# Patient Record
Sex: MALE | Race: WHITE | NOT HISPANIC OR LATINO | URBAN - METROPOLITAN AREA
[De-identification: names, ages, dates, MRNs, and addresses within clinical notes are randomized per-mention and may not be internally consistent; named-entity substitution may affect disease eponyms.]

---
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
## 2019-01-07 LAB — KEPPRA (LEVETIRACETAM) LEVEL: 3.1 UG/ML (ref 12–46)

## 2019-01-09 DIAGNOSIS — G40.109 FOCAL EPILEPSY (H): Primary | ICD-10-CM

## 2019-04-03 NOTE — LETTER
2018       RE: Leobardo Leonard  : 2000   MRN: [id number]      Dear Colleague,    Thank you for referring your patient, Leobardo Leonard, to the [facility] EPILEPSY CARE at [facility]. Please see a copy of my visit note below.    [facility] Epilepsy Care Progress Note      Patient:  Leobardo Leonard  :  2000   Age:  17 year old   Today's Office Visit:  2018    Epilepsy Data:    Ambidextrous man whose seizures started on 2017.  Mom witnessed his seizure, which occurred out of sleep; he suddenly became stiff and started gurgling and shaking all over.  This lasted about 2 minutes.  He was confused postictally.  The patient binge drink on that day.  He had an EEG on 3/24/17, which was abnormal showing focal slowing and frequent sharp transients and spike-and-slow waves over the left frontotemporal head region, maximal at F7.  He was recommended to start lamotrigine by Dr. Calvert, but the patient wanted to get a second opinion. He had a seizure during EEG at [facility].  Mom admitted that she noted similar episode the day before but didn't know it's a seizure.  The patient does not drink regularly, but he does binge drinking every time he drinks.  We started him on oxcarbazepine in 2017.       History of Present Illness:    Miguel had increased seizures recently.  During the previous visit on 2018 we increased his oxcarbazepine to 600-900 mg per day. He still had frequent seizures daily or every other day.  His OXC was increased to 900 mg bid.  They decreased to 2-3 times a week.   The description of his seizures is that he has no warning.  He stares, has some eye twitches.  Last for 15-20 seconds, and then is immediately back to normal.  Sleep-deprivation may have been a trigger.  Because of his work he was sleeping late and was waking up early.  He denies missing medications, and alcohol.  Mom thinks heat might be a trigger too.   His last OXC level after increasing to 900 mg bid was 22.  He denies dizziness, imbalance, double vision and other side effects.      Current Outpatient Prescriptions   Medication Sig Dispense Refill     amphetamine-dextroamphetamine (ADDERALL XR) 10 MG per 24 hr capsule Take 10 mg by mouth daily       ibuprofen (ADVIL/MOTRIN) 200 MG tablet        OXcarbazepine (TRILEPTAL) 300 MG tablet Take 3 tablets (900 mg) by mouth 2 times daily 558 tablet 1     SM CALCIUM 600+D3 600-800 MG-UNIT TABS TAKE 1 TABLET BY MOUTH ONCE DAILY 30 tablet 11      Results for YOLANDA POON (MRN [id number]) as of 7/19/2018 11:26   Ref. Range 5/2/2018 07:14   Oxcarbazepine Latest Ref Range: 3 - 35 mcg/mL 22     Other Issues:    Is patient safe to drive:  No    Exam:    /66 (BP Location: Right arm, Patient Position: Chair, Cuff Size: Adult Regular)  Pulse 80  Temp 98.6  F (37  C)  Wt 74.9 kg (165 lb 3.2 oz)  BMI 23.7 kg/m2     Wt Readings from Last 5 Encounters:   07/19/18 74.9 kg (165 lb 3.2 oz) (69 %)*   01/18/18 76.4 kg (168 lb 6.4 oz) (75 %)*   07/11/17 75.9 kg (167 lb 6.4 oz) (77 %)*   04/18/17 71.7 kg (158 lb) (68 %)*     * Growth percentiles are based on [facility] 2-20 Years data.     General Appearance: Alert, awake, cooperative and pleasant, NAD  Gait and tandem gait: steady  Attention Span:  Normal  Language/speech: No aphasia or dysarthria  Extraocular Movements: Intact  Coordination:  Normal finger to nose  Facial Strength:  Normal  Tongue Strength:  Normal  Motor Exam:  Normal tone, bulk and strength    Assessment and Plan:    Intractable focal epilepsy: had brief focal impaired aware seizures 2-3 times a week.  Seizures are brief.  Increasing oxcarbazepine has helped some.  His last level was 22.  She denies side effects.  I discussed increasing oxcarbazepine to 900-1200 for 1 week and then 1200 mg twice a day.  I changed the patient's tab to 600 mg.  The patient breakthrough this dose of oxcarbazepine we will need to  add another medication.  Has only tried oxcarbazepine.  May add Keppra.     - Increase OXC to 900-1200 mg per day for 1 week, then increase to 1200 mg bid.  - Obtain OXC level in 3 weeks.  - RTC in 4 months      As described above, I met with the patient for 25 minutes and during this time counseling was greater than 50% of the visit time.  Geni Hickman MD                    Again, thank you for allowing me to participate in the care of your patient.      Sincerely,    Geni Hickman MD       DISPLAY PLAN FREE TEXT

## 2019-06-17 ENCOUNTER — OFFICE VISIT (OUTPATIENT)
Dept: NEUROLOGY | Facility: CLINIC | Age: 19
End: 2019-06-17
Payer: COMMERCIAL

## 2019-06-17 VITALS — DIASTOLIC BLOOD PRESSURE: 76 MMHG | SYSTOLIC BLOOD PRESSURE: 118 MMHG | BODY MASS INDEX: 25.05 KG/M2 | WEIGHT: 174.6 LBS

## 2019-06-17 DIAGNOSIS — G40.109 FOCAL EPILEPSY (H): Primary | ICD-10-CM

## 2019-06-17 RX ORDER — LEVETIRACETAM 1000 MG/1
TABLET ORAL
Qty: 225 TABLET | Refills: 1 | Status: SHIPPED | OUTPATIENT
Start: 2019-06-17 | End: 2019-10-14

## 2019-06-17 NOTE — LETTER
2019       RE: Leobardo Leonard  : 2000   MRN: [id number]      Dear Colleague,    Thank you for referring your patient, Leobardo Leonard, to the [facility] EPILEPSY CARE at [facility]. Please see a copy of my visit note below.      [facility] Epilepsy Care Progress Note      Patient:  Leobardo Leonard  :  2000   Age:  18 year old   Today's Office Visit:  2019    Epilepsy Data:    Patient History  Primary Epileptologist/Provider: Geni Hickman M.D.  Age of Onset: 16 years  Other Relevant Dx/ Issues: histry of binge drinking     Tests/Surgery History  Last EE17 frequent epileptiform discharges over the left frontotemporal head region.  The patient also had a complex partial seizure of left frontotemporal onset.    Last MRI: 3/24/17    Seizure Record  Current Visit Date: 19  Previous Visit Date: (P) 18  Months since last visit: (P) 5.52  Seizure Type 1: Tonic seizures  Description of Sz Type 1: out of sleep; he suddenly became stiff and started gurgling and shaking all over  Seizure Type 2: Complex partial seizures unspecified  Description of Sz Type 2: he stares and left eye twitches and he swallows a lot and hands fumble, mainly left hand.     Ambidextrous man whose seizures started on 2017.  Mom witnessed his seizure, which occurred out of sleep; he suddenly became stiff and started gurgling and shaking all over.  This lasted about 2 minutes.  He was confused postictally.  The patient binge drink on that day.  He had an EEG on 3/24/17, which was abnormal showing focal slowing and frequent sharp transients and spike-and-slow waves over the left frontotemporal head region, maximal at F7.  He was recommended to start lamotrigine by Dr. Calvert, but the patient wanted to get a second opinion. He had a seizure during EEG at [facility].  Mom admitted that she noted similar episode the day before but didn't know it's a seizure.   The patient does not drink regularly, but he does binge drinking every time he drinks.  We started him on oxcarbazepine in April 2017.  Keppra was added due to breakthrough seizures despite therapeutic doses of OXC.       History of Present Illness:   He continues to have complex partial seizures despite increase in keppra to 750 mg BID. Since his last visit he had 3 CPS lasting 30-40 seconds and consisting of staring, swallowing and non-responsiveness. No tonic seizures. The most recent seizures was a month ago, which mom witnessed while they were doing dishes. Prior to that he had 2 seizures within a 2 week period of each other ~ 2 months ago. He does not identify any triggers. He does not have auras. He is usually post-ictal for ~2-3 minutes. He sleeps regular hours and he is compliant with medication. He has never missed a dose of medication. He has not been drinking heavily or regularly and seizures have occurred during periods of 2 weeks abstinence from alcohol. Last levels were 1/16/19 with keppra 9.9 resulting in dose increase and oxcarbazepine 30.     Current AEDS  Oxcarbazepine 1200 mg BID   Keppra 750 mg BID     Otherwise he is doing well. No falls, no hospitalization. No other significant health issues. He works at the [employer] many nights a week.     Current Outpatient Medications   Medication Sig Dispense Refill     amphetamine-dextroamphetamine (ADDERALL XR) 10 MG per 24 hr capsule Take 10 mg by mouth daily       ibuprofen (ADVIL/MOTRIN) 200 MG tablet        levETIRAcetam (KEPPRA) 1000 MG tablet Take 1 tab twice a day for 1 week and then increase to 1 tab in the morning and 1 1/2 tabs in the evening. 225 tablet 1     OXcarbazepine (TRILEPTAL) 600 MG tablet Take 2 in the morning and 2 in the evening. 360 tablet 3     SM CALCIUM 600+D3 600-800 MG-UNIT TABS TAKE 1 TABLET BY MOUTH ONCE DAILY 30 tablet 11      Results for YOLANDA POON (MRN [id number]) as of 6/17/2019 13:24   Ref. Range  1/16/2019 12:53   Keppra (Levetiracetam) Level Latest Ref Range: 12.0 - 46.0 ug/mL 9.9 (A)   Oxcarbazepine Latest Ref Range: 3 - 35 ug/mL 30     Medication Notes:        AED Medication Compliance:  compliant all of the time  Using a pill box:  Yes    Review of Systems:  Lethargy / Tiredness:  No  Nausea / Vomiting:  No  Double Vision:  No  Sleepiness:  No  Depression:  No  Slowed Cognitive Function:  No  Memory Problems:  No  Poor Balance:  No  Dizziness:  No  Appetite Changes:  No  Blurred Vision:  No  Decreased Libido:  No  Sleep Changes:  No  Behavioral Changes:  No  Skin: negative  Respiratory: No shortness of breath, dyspnea on exertion, cough, or hemoptysis  Cardiovascular: negative  Have you experienced a traumatic fall since your last visit: NO  Are these falls related to your seizures: Not Applicable    Other Issues:  None  Is patient safe to drive:  No and he was reminded of [place] state driving laws regarding driving or operating heavy machinery.     Exam:    /76 (BP Location: Right arm, Patient Position: Chair, Cuff Size: Adult Regular)   Wt 174 lb 9.6 oz (79.2 kg)   BMI 25.05 kg/m        Wt Readings from Last 5 Encounters:   06/17/19 174 lb 9.6 oz (79.2 kg) (76 %)*   12/31/18 179 lb 6.4 oz (81.4 kg) (82 %)*   07/19/18 165 lb 3.2 oz (74.9 kg) (69 %)*   01/18/18 168 lb 6.4 oz (76.4 kg) (75 %)*   07/11/17 167 lb 6.4 oz (75.9 kg) (77 %)*     * Growth percentiles are based on CDC (Boys, 2-20 Years) data.     General Appearance: Normal  Gait:  Normal  Attention Span:  Normal  Language:  Normal  Extraocular Movements:  Normal  Coordination:  Normal  Visual Fields:  Normal  Facial Sensation:  Normal  Facial Strength:  Normal  Tongue: Midline   Limb Strength:  5/5 throughout   Limb Tone:  Normal  Limb Sensation:  Normal  Reflexes: 2+ throughout.   General Physical Findings:  Normocephalic atraumatic, no increased work of breathing, skin with small scars over back of hands and right forearm.      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 3 complex partial seizures in the past 6 months despite keppra dose increase, good compliance and minimal alcohol. He is on a fairly low dose of keppra and he has good tolerance of his antiseizure medications. We will increase keppra further and have him return in 4 months so that we can continue to make medication adjustments if needed.     - Continue oxcarbazepine 1200 mg BID   - Increase keppra to 1000 - 1500 mg   - RTC in 4 months     Patient was discussed and examined with Dr. García who agrees with my assessment and plan.   Yolanda Roberts MD   Neurology PGY 2     I saw and evaluated the patient and discussed the plan of care with the resident. I agree with the findings, assessment and plan as reported by the resident.  Geni Hickman MD                  Again, thank you for allowing me to participate in the care of your patient.      Sincerely,    Geni Hickman MD

## 2019-06-17 NOTE — PROGRESS NOTES
[facility] Epilepsy Care Progress Note      Patient:  Leobardo Leonard  :  2000   Age:  18 year old   Today's Office Visit:  2019    Epilepsy Data:    Patient History  Primary Epileptologist/Provider: Geni Hickman M.D.  Age of Onset: 16 years  Other Relevant Dx/ Issues: histry of binge drinking     Tests/Surgery History  Last EE17 frequent epileptiform discharges over the left frontotemporal head region.  The patient also had a complex partial seizure of left frontotemporal onset.    Last MRI: 3/24/17    Seizure Record  Current Visit Date: 19  Previous Visit Date: (P) 18  Months since last visit: (P) 5.52  Seizure Type 1: Tonic seizures  Description of Sz Type 1: out of sleep; he suddenly became stiff and started gurgling and shaking all over  Seizure Type 2: Complex partial seizures unspecified  Description of Sz Type 2: he stares and left eye twitches and he swallows a lot and hands fumble, mainly left hand.     Ambidextrous man whose seizures started on 2017.  Mom witnessed his seizure, which occurred out of sleep; he suddenly became stiff and started gurgling and shaking all over.  This lasted about 2 minutes.  He was confused postictally.  The patient binge drink on that day.  He had an EEG on 3/24/17, which was abnormal showing focal slowing and frequent sharp transients and spike-and-slow waves over the left frontotemporal head region, maximal at F7.  He was recommended to start lamotrigine by Dr. Calvert, but the patient wanted to get a second opinion. He had a seizure during EEG at [facility].  Mom admitted that she noted similar episode the day before but didn't know it's a seizure.  The patient does not drink regularly, but he does binge drinking every time he drinks.  We started him on oxcarbazepine in 2017.  Keppra was added due to breakthrough seizures despite therapeutic doses of OXC.       History of Present Illness:   He continues to have complex  partial seizures despite increase in keppra to 750 mg BID. Since his last visit he had 3 CPS lasting 30-40 seconds and consisting of staring, swallowing and non-responsiveness. No tonic seizures. The most recent seizures was a month ago, which mom witnessed while they were doing dishes. Prior to that he had 2 seizures within a 2 week period of each other ~ 2 months ago. He does not identify any triggers. He does not have auras. He is usually post-ictal for ~2-3 minutes. He sleeps regular hours and he is compliant with medication. He has never missed a dose of medication. He has not been drinking heavily or regularly and seizures have occurred during periods of 2 weeks abstinence from alcohol. Last levels were 1/16/19 with keppra 9.9 resulting in dose increase and oxcarbazepine 30.     Current AEDS  Oxcarbazepine 1200 mg BID   Keppra 750 mg BID     Otherwise he is doing well. No falls, no hospitalization. No other significant health issues. He works at the baseball field many nights a week.     Current Outpatient Medications   Medication Sig Dispense Refill     amphetamine-dextroamphetamine (ADDERALL XR) 10 MG per 24 hr capsule Take 10 mg by mouth daily       ibuprofen (ADVIL/MOTRIN) 200 MG tablet        levETIRAcetam (KEPPRA) 1000 MG tablet Take 1 tab twice a day for 1 week and then increase to 1 tab in the morning and 1 1/2 tabs in the evening. 225 tablet 1     OXcarbazepine (TRILEPTAL) 600 MG tablet Take 2 in the morning and 2 in the evening. 360 tablet 3     SM CALCIUM 600+D3 600-800 MG-UNIT TABS TAKE 1 TABLET BY MOUTH ONCE DAILY 30 tablet 11      Results for YOLANDA POON (MRN [id number]) as of 6/17/2019 13:24   Ref. Range 1/16/2019 12:53   Keppra (Levetiracetam) Level Latest Ref Range: 12.0 - 46.0 ug/mL 9.9 (A)   Oxcarbazepine Latest Ref Range: 3 - 35 ug/mL 30     Medication Notes:        AED Medication Compliance:  compliant all of the time  Using a pill box:  Yes    Review of Systems:  Lethargy /  Tiredness:  No  Nausea / Vomiting:  No  Double Vision:  No  Sleepiness:  No  Depression:  No  Slowed Cognitive Function:  No  Memory Problems:  No  Poor Balance:  No  Dizziness:  No  Appetite Changes:  No  Blurred Vision:  No  Decreased Libido:  No  Sleep Changes:  No  Behavioral Changes:  No  Skin: negative  Respiratory: No shortness of breath, dyspnea on exertion, cough, or hemoptysis  Cardiovascular: negative  Have you experienced a traumatic fall since your last visit: NO  Are these falls related to your seizures: Not Applicable    Other Issues:  None  Is patient safe to drive:  No and he was reminded of [place] state driving laws regarding driving or operating heavy machinery.     Exam:    /76 (BP Location: Right arm, Patient Position: Chair, Cuff Size: Adult Regular)   Wt 174 lb 9.6 oz (79.2 kg)   BMI 25.05 kg/m       Wt Readings from Last 5 Encounters:   06/17/19 174 lb 9.6 oz (79.2 kg) (76 %)*   12/31/18 179 lb 6.4 oz (81.4 kg) (82 %)*   07/19/18 165 lb 3.2 oz (74.9 kg) (69 %)*   01/18/18 168 lb 6.4 oz (76.4 kg) (75 %)*   07/11/17 167 lb 6.4 oz (75.9 kg) (77 %)*     * Growth percentiles are based on [facility] (Boys, 2-20 Years) data.     General Appearance: Normal  Gait:  Normal  Attention Span:  Normal  Language:  Normal  Extraocular Movements:  Normal  Coordination:  Normal  Visual Fields:  Normal  Facial Sensation:  Normal  Facial Strength:  Normal  Tongue: Midline   Limb Strength:  5/5 throughout   Limb Tone:  Normal  Limb Sensation:  Normal  Reflexes: 2+ throughout.   General Physical Findings:  Normocephalic atraumatic, no increased work of breathing, skin with small scars over back of hands and right forearm.     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 3 complex partial seizures in the past 6 months despite keppra dose increase, good compliance and minimal alcohol. He is on a fairly low dose of keppra and he has good  tolerance of his antiseizure medications. We will increase keppra further and have him return in 4 months so that we can continue to make medication adjustments if needed.     - Continue oxcarbazepine 1200 mg BID   - Increase keppra to 1000 - 1500 mg   - RTC in 4 months     Patient was discussed and examined with Dr. García who agrees with my assessment and plan.   Yolanda Roberts MD   Neurology PGY 2     I saw and evaluated the patient and discussed the plan of care with the resident. I agree with the findings, assessment and plan as reported by the resident.  Geni Hickman MD

## 2019-10-14 ENCOUNTER — OFFICE VISIT (OUTPATIENT)
Dept: NEUROLOGY | Facility: CLINIC | Age: 19
End: 2019-10-14
Payer: COMMERCIAL

## 2019-10-14 VITALS
TEMPERATURE: 98.5 F | BODY MASS INDEX: 24.97 KG/M2 | SYSTOLIC BLOOD PRESSURE: 125 MMHG | DIASTOLIC BLOOD PRESSURE: 68 MMHG | HEART RATE: 83 BPM | WEIGHT: 174 LBS

## 2019-10-14 DIAGNOSIS — G40.109 FOCAL EPILEPSY (H): Primary | ICD-10-CM

## 2019-10-14 RX ORDER — LEVETIRACETAM 1000 MG/1
TABLET ORAL
Qty: 270 TABLET | Refills: 1 | Status: SHIPPED | OUTPATIENT
Start: 2019-10-14 | End: 2020-03-19

## 2019-10-14 RX ORDER — OXCARBAZEPINE 600 MG/1
TABLET, FILM COATED ORAL
Qty: 360 TABLET | Refills: 1 | Status: SHIPPED | OUTPATIENT
Start: 2019-10-14 | End: 2020-04-14

## 2019-10-14 ASSESSMENT — PAIN SCALES - GENERAL: PAINLEVEL: NO PAIN (0)

## 2019-10-14 NOTE — LETTER
"10/14/2019       RE: Leobardo Leonard  : 2000   MRN: [id number]      Dear Colleague,    Thank you for referring your patient, Leobardo Leonard, to the [facility] EPILEPSY CARE at [facility]. Please see a copy of my visit note below.    [facility] Epilepsy Care Progress Note      Patient:  Leobardo Leonard  :  2000   Age:  19 year old   Today's Office Visit:  10/14/2019    Epilepsy Data:    Patient History  Primary Epileptologist/Provider: (ARBEN) Geni Hickman M.D.  Age of Onset: (P) 16 years  Other Relevant Dx/ Issues: (P) histry of binge drinking     Tests/Surgery History  Last EEG: (P) 17 frequent epileptiform discharges over the left frontotemporal head region.  The patient also had a complex partial seizure of left frontotemporal onset.    Last MRI: (P) 3/24/17    Seizure Record  Current Visit Date: (P) 10/14/19  Previous Visit Date: (P) 19  Months since last visit: (P) 3.91  Seizure Type 1: (P) Tonic seizures  Description of Sz Type 1: (P) out of sleep; he suddenly became stiff and started gurgling and shaking all over  Seizure Type 2: (P) Complex partial seizures unspecified  Description of Sz Type 2: (P) he stares and left eye twitches and he swallows a lot and hands fumble, mainly left hand.     History of Present Illness:    Leobardo is accompanied by his mom for a follow up on his seizures. He had 3 seizures since his last visit in mid-. Last seizure was 1 1/2 m ago. They were his usual seizures of focal impaired-aware Szs. His Keppra was increased in the last visit. Patient and his Mom have noted he is irritable and gets angry easily, a little bit, but not much worse than baseline.     He did cut back on his drinking. He drinks occasionally, and doesn't drink \"much\" each time.       Current Outpatient Medications   Medication Sig Dispense Refill     amphetamine-dextroamphetamine (ADDERALL XR) 10 MG per 24 hr capsule Take 10 mg by " mouth daily       ibuprofen (ADVIL/MOTRIN) 200 MG tablet        levETIRAcetam (KEPPRA) 1000 MG tablet Take 1 tab twice a day for 1 week and then increase to 1 tab in the morning and 1 1/2 tabs in the evening. 225 tablet 1     OXcarbazepine (TRILEPTAL) 600 MG tablet Take 2 in the morning and 2 in the evening. 360 tablet 3     SM CALCIUM 600+D3 600-800 MG-UNIT TABS TAKE 1 TABLET BY MOUTH ONCE DAILY 30 tablet 11        Ref. Range 1/16/2019 12:53   Sodium Latest Ref Range: 136 - 146 mmol/L 138   Keppra (Levetiracetam) Level Latest Ref Range: 12.0 - 46.0 ug/mL 9.9 (A)   Oxcarbazepine Latest Ref Range: 3 - 35 ug/mL 30     Medication Notes:       AED Medication Compliance:  noncompliant some of the time, has been more compliant, mom says he is taking his medications regularly, sometimes he is late for couple hours.    Review of Systems:  Lethargy / Tiredness:  Yes, sometimes  Nausea / Vomiting:  No  Double Vision:  No  Sleepiness:  sometimes  Poor Balance:  No  Dizziness:  No  Blurred Vision:  No  Have you experienced a traumatic fall since your last visit: NO    Other Issues:   Is patient safe to drive:  No    Exam:    /68 (BP Location: Right arm, Patient Position: Chair, Cuff Size: Adult Regular)   Pulse 83   Temp 98.5  F (36.9  C)   Wt 174 lb (78.9 kg)   BMI 24.97 kg/m        Wt Readings from Last 5 Encounters:   10/14/19 174 lb (78.9 kg)   06/17/19 174 lb 9.6 oz (79.2 kg) (76 %)*   12/31/18 179 lb 6.4 oz (81.4 kg) (82 %)*   07/19/18 165 lb 3.2 oz (74.9 kg) (69 %)*   01/18/18 168 lb 6.4 oz (76.4 kg) (75 %)*     * Growth percentiles are based on [facility] (Boys, 2-20 Years) data.     General Appearance: Alert, awake, cooperative and pleasant, NAD  Gait and tandem gait: steady  Attention Span:  Normal  Language/speech: No aphasia or dysarthria  Extraocular Movements: Intact  Coordination:  Normal finger to nose  Facial Strength:  Normal  Tongue Strength:  Normal  Motor Exam:  Normal tone, bulk and  strength     Assessment and Plan:     Focal epilepsy with 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 He had 3 seizures in the past 4 months, last one was 1 1/2-month ago. He feels a little bit more irritable than baseline since increasing levetiracetam.  He has a history of hyponatremia and his last oxcarbazepine level was 30.  I discussed increasing levetiracetam, in case he noted worsening of his mood will need to think of other medications. He hasn't tried any other medications. May replace LVT with birivaracetam. Other options include lamotrigine, zonisamide, lacosamide and topiramate among others.       - Increase levetiracetam to 1500 mg twice daily.  - Continue oxcarbazepine 1200 mg twice daily.  - Obtain AED levels for efficacy and compliance.   - RTC 6 months.      As described above, I met with the patient for 20 minutes and during this time counseling was greater than 50% of the visit time.  Geni Hickman MD                    Again, thank you for allowing me to participate in the care of your patient.      Sincerely,    Geni Hickman MD

## 2019-10-14 NOTE — PROGRESS NOTES
[facility]      Patient:  Leobardo Leonard  :  2000   Age:  19 year old   Today's Office Visit:  10/14/2019    Epilepsy Data:    Patient History  Primary Epileptologist/Provider: Geni Hickman M.D. (P)  Age of Onset: (P) 16 years  Other Relevant Dx/ Issues: (P) histry of binge drinking     Tests/Surgery History  Last EEG: (P) 17 frequent epileptiform discharges over the left frontotemporal head region.  The patient also had a complex partial seizure of left frontotemporal onset.    Last MRI: (P) 3/24/17    Seizure Record  Current Visit Date: (P) 10/14/19  Previous Visit Date: (P) 19  Months since last visit: (P) 3.91  Seizure Type 1: (P) Tonic seizures  Description of Sz Type 1: (P) out of sleep; he suddenly became stiff and started gurgling and shaking all over  Seizure Type 2: (P) Complex partial seizures unspecified  Description of Sz Type 2: (P) he stares and left eye twitches and he swallows a lot and hands fumble, mainly left hand.     History of Present Illness:    Leobardo is accompanied by his mom for a follow up on his seizures. He had 3 seizures since his last visit in mid-. Last seizure was 1 1/2 m ago. They were his usual seizures of focal impaired-aware Szs. His Keppra was increased in the last visit. Patient and his Mom have noted he is irritable and gets angry easily, a little bit, but not much worse than baseline.     He did cut back on his drinking. He drinks occasionally, and doesn't drink \"much\" each time.       Current Outpatient Medications   Medication Sig Dispense Refill     amphetamine-dextroamphetamine (ADDERALL XR) 10 MG per 24 hr capsule Take 10 mg by mouth daily       ibuprofen (ADVIL/MOTRIN) 200 MG tablet        levETIRAcetam (KEPPRA) 1000 MG tablet Take 1 tab twice a day for 1 week and then increase to 1 tab in the morning and 1 1/2 tabs in the evening. 225 tablet 1     OXcarbazepine (TRILEPTAL) 600 MG tablet Take 2 in the " morning and 2 in the evening. 360 tablet 3     SM CALCIUM 600+D3 600-800 MG-UNIT TABS TAKE 1 TABLET BY MOUTH ONCE DAILY 30 tablet 11        Ref. Range 1/16/2019 12:53   Sodium Latest Ref Range: 136 - 146 mmol/L 138   Keppra (Levetiracetam) Level Latest Ref Range: 12.0 - 46.0 ug/mL 9.9 (A)   Oxcarbazepine Latest Ref Range: 3 - 35 ug/mL 30     Medication Notes:       AED Medication Compliance:  noncompliant some of the time, has been more compliant, mom says he is taking his medications regularly, sometimes he is late for couple hours.    Review of Systems:  Lethargy / Tiredness:  Yes, sometimes  Nausea / Vomiting:  No  Double Vision:  No  Sleepiness:  sometimes  Poor Balance:  No  Dizziness:  No  Blurred Vision:  No  Have you experienced a traumatic fall since your last visit: NO    Other Issues:   Is patient safe to drive:  No    Exam:    /68 (BP Location: Right arm, Patient Position: Chair, Cuff Size: Adult Regular)   Pulse 83   Temp 98.5  F (36.9  C)   Wt 174 lb (78.9 kg)   BMI 24.97 kg/m       Wt Readings from Last 5 Encounters:   10/14/19 174 lb (78.9 kg)   06/17/19 174 lb 9.6 oz (79.2 kg) (76 %)*   12/31/18 179 lb 6.4 oz (81.4 kg) (82 %)*   07/19/18 165 lb 3.2 oz (74.9 kg) (69 %)*   01/18/18 168 lb 6.4 oz (76.4 kg) (75 %)*     * Growth percentiles are based on CDC (Boys, 2-20 Years) data.     General Appearance: Alert, awake, cooperative and pleasant, NAD  Gait and tandem gait: steady  Attention Span:  Normal  Language/speech: No aphasia or dysarthria  Extraocular Movements: Intact  Coordination:  Normal finger to nose  Facial Strength:  Normal  Tongue Strength:  Normal  Motor Exam:  Normal tone, bulk and strength     Assessment and Plan:     Focal epilepsy with 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 He had 3 seizures in the past 4 months, last one was 1 1/2-month ago. He feels a little bit more irritable than baseline since increasing levetiracetam.  He has a history of  hyponatremia and his last oxcarbazepine level was 30.  I discussed increasing levetiracetam, in case he noted worsening of his mood will need to think of other medications. He hasn't tried any other medications. May replace LVT with birivaracetam. Other options include lamotrigine, zonisamide, lacosamide and topiramate among others.       - Increase levetiracetam to 1500 mg twice daily.  - Continue oxcarbazepine 1200 mg twice daily.  - Obtain AED levels for efficacy and compliance.   - RTC 6 months.      As described above, I met with the patient for 20 minutes and during this time counseling was greater than 50% of the visit time.  Geni Hickman MD

## 2019-10-21 ENCOUNTER — TRANSFERRED RECORDS (OUTPATIENT)
Dept: HEALTH INFORMATION MANAGEMENT | Facility: CLINIC | Age: 19
End: 2019-10-21

## 2019-10-21 LAB — SODIUM SERPL-SCNC: 138 MMOL/L (ref 136–146)

## 2020-03-12 DIAGNOSIS — G40.109 FOCAL EPILEPSY (H): ICD-10-CM

## 2020-03-19 RX ORDER — LEVETIRACETAM 1000 MG/1
1500 TABLET ORAL
Qty: 270 TABLET | Refills: 0 | Status: SHIPPED | OUTPATIENT
Start: 2020-03-19 | End: 2020-05-05

## 2020-03-19 NOTE — TELEPHONE ENCOUNTER
"Last visit with  / [facility] provider 10/14/19  Future Appointments   Date Time Provider Department Center   4/20/2020  2:00 PM Geni Hickman MD [facility] Owned     Plan at last visit:-  \"- Increase levetiracetam to 1500 mg twice daily.  - Continue oxcarbazepine 1200 mg twice daily.  - Obtain AED levels for efficacy and compliance.   - RTC 6 months.\"    Refill request for levetiracetam received    Will refill to the appointment as per protocol  "

## 2020-03-27 DIAGNOSIS — G40.109 FOCAL EPILEPSY (H): ICD-10-CM

## 2020-03-27 RX ORDER — LEVETIRACETAM 1000 MG/1
TABLET ORAL
Qty: 270 TABLET | Refills: 0 | OUTPATIENT
Start: 2020-03-27

## 2020-04-10 DIAGNOSIS — G40.109 FOCAL EPILEPSY (H): ICD-10-CM

## 2020-04-14 DIAGNOSIS — G40.109 FOCAL EPILEPSY (H): ICD-10-CM

## 2020-04-14 RX ORDER — LEVETIRACETAM 1000 MG/1
1500 TABLET ORAL
Qty: 270 TABLET | Refills: 0 | Status: CANCELLED | OUTPATIENT
Start: 2020-04-14

## 2020-04-14 RX ORDER — OXCARBAZEPINE 600 MG/1
1200 TABLET, FILM COATED ORAL 2 TIMES DAILY
Qty: 120 TABLET | Refills: 0 | Status: SHIPPED | OUTPATIENT
Start: 2020-04-14 | End: 2020-04-20

## 2020-04-20 ENCOUNTER — VIRTUAL VISIT (OUTPATIENT)
Dept: NEUROLOGY | Facility: CLINIC | Age: 20
End: 2020-04-20
Payer: COMMERCIAL

## 2020-04-20 DIAGNOSIS — G40.109 FOCAL EPILEPSY (H): ICD-10-CM

## 2020-04-20 RX ORDER — OXCARBAZEPINE 600 MG/1
TABLET, FILM COATED ORAL
Qty: 405 TABLET | Refills: 3 | Status: SHIPPED | OUTPATIENT
Start: 2020-04-20 | End: 2020-10-26

## 2020-04-20 RX ORDER — FLUTICASONE PROPIONATE 50 MCG
1 SPRAY, SUSPENSION (ML) NASAL DAILY
COMMUNITY

## 2020-04-20 NOTE — LETTER
2020    RE: Leobardo Leonard  [address]  [facility] [address]     Leobardo Leonard is a 19 year old male who is being evaluated via a billable video visit.      Video Start Time: 2:18     Additional provider notes:    [facility] Epilepsy Care Progress Note      Patient:  Leobardo Leonard  :  2000   Age:  19 year old   Today's Virtual Visit:  2020    Epilepsy Data:    Patient History  Primary Epileptologist/Provider: BAILEE) Geni Hickman M.D.  Age of Onset: (P) 16 years  Other Relevant Dx/ Issues: (P) histry of binge drinking     Tests/Surgery History  Last EEG: (P) 17 frequent epileptiform discharges over the left frontotemporal head region.  The patient also had a complex partial seizure of left frontotemporal onset.    Last MRI: (P) 3/24/17    Seizure Record  Current Visit Date: (P) 19  Previous Visit Date: (P) 10/14/19  Months since last visit: (P) 6.21  Seizure Type 1: (P) Tonic seizures  Description of Sz Type 1: (P) out of sleep; he suddenly became stiff and started gurgling and shaking all over  Seizure Type 2: (P) Complex partial seizures unspecified  Description of Sz Type 2: (P) he stares and left eye twitches and he swallows a lot and hands fumble, mainly left hand.     History of Present Illness:   Leobardo is accompanied by his mom during the video visit today. Leobardo had 3-4 seizures in the past 6 months. These were longer than usual and he had a lot of movements during these. He repeatedly squated down and stood up and was walking during couple of them. He is unresponsive and very confused afterwards.  They lasted about a min or longer sometimes.  The last one was about 1 1/2 -2 months ago.  He did not have any auras.     He doesn't know of any trigger for his seizures. He did not miss his medication, was not sick, and was not sleep-deprived.    Leobardo is taking LVT 1500 mg bid and OXC 1200 mg bid. He denies side effects.    Current Outpatient Medications    Medication Sig Dispense Refill     amphetamine-dextroamphetamine (ADDERALL XR) 10 MG per 24 hr capsule Take 10 mg by mouth daily       fluticasone (FLONASE) 50 MCG/ACT nasal spray Spray 1 spray into both nostrils daily       ibuprofen (ADVIL/MOTRIN) 200 MG tablet        levETIRAcetam (KEPPRA) 1000 MG tablet Take 1.5 tablets (1,500 mg) by mouth 2 times daily 270 tablet 0     OXcarbazepine (TRILEPTAL) 600 MG tablet Take 2 tablets (1,200 mg) by mouth 2 times daily 120 tablet 0     SM CALCIUM 600+D3 600-800 MG-UNIT TABS TAKE 1 TABLET BY MOUTH ONCE DAILY 30 tablet 11        Review of Systems:  Lethargy / Tiredness:  No  Nausea / Vomiting:  No  Double Vision:  No  Sleepiness:  No  Depression:  No  Slowed Cognitive Function:  No  Memory Problems:  No  Dizziness:  He gets dizzy in the morning  Appetite Changes:  No  Blurred Vision:  No  Sleep Changes:  No  Respiratory: No shortness of breath and No cough  Cardiovascular: negative  Have you experienced a traumatic fall since your last visit: NO    Other Issues:    Is patient safe to drive:  No      Assessment and Plan:    Intractable focal epilepsy:  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 Leobardo had 3-4 seizures in the past 6 months. He did not miss his medications and did not have any known triggers.     - Continue LVT 1500 mg  Bid  - Increase OXC to 7903-3185  - RTC in 6 months      As described above, I met with the patient face to face via video Zaelab for 16 minutes and during this time counseling was greater than 50% of the visit time.  Geni Hickman MD       Video-Visit Details    Type of service:  Video Visit     Video End Time (time video stopped): 2:34    Originating Location (pt. Location): Home    Distant Location (provider location):  [facility] EPILEPSY CARE     Mode of Communication:  Video Conference via Zaelab    Geni Hickman MD

## 2020-04-20 NOTE — PROGRESS NOTES
"Leobardo Leonard is a 19 year old male who is being evaluated via a billable video visit.      The patient has been notified of following:     \"This video visit will be conducted via a call between you and your physician/provider. We have found that certain health care needs can be provided without the need for an in-person physical exam.  This service lets us provide the care you need with a video conversation.  If a prescription is necessary we can send it directly to your pharmacy.  If lab work is needed we can place an order for that and you can then stop by our lab to have the test done at a later time.    Video visits are billed at different rates depending on your insurance coverage.  Please reach out to your insurance provider with any questions.    If during the course of the call the physician/provider feels a video visit is not appropriate, you will not be charged for this service.\"    Patient has given verbal consent for Video visit? Yes    How would you like to obtain your AVS? Mail a copy    Patient would like the video invitation sent by: Send to e-mail at: [email or web address]       Video Start Time: 2:18     Additional provider notes:    [facility]      Patient:  Leobardo Leonard  :  2000   Age:  19 year old   Today's Virtual Visit:  2020    Epilepsy Data:    Patient History  Primary Epileptologist/Provider: Geni Hickman M.D. (P)  Age of Onset: (P) 16 years  Other Relevant Dx/ Issues: (P) histry of binge drinking     Tests/Surgery History  Last EEG: (P) 17 frequent epileptiform discharges over the left frontotemporal head region.  The patient also had a complex partial seizure of left frontotemporal onset.    Last MRI: (P) 3/24/17    Seizure Record  Current Visit Date: (P) 19  Previous Visit Date: (P) 10/14/19  Months since last visit: (P) 6.21  Seizure Type 1: (P) Tonic seizures  Description of Sz Type 1: (P) out of sleep; he " suddenly became stiff and started gurgling and shaking all over  Seizure Type 2: (P) Complex partial seizures unspecified  Description of Sz Type 2: (P) he stares and left eye twitches and he swallows a lot and hands fumble, mainly left hand.     History of Present Illness:   Leobardo is accompanied by his mom during the video visit today. Leobardo had 3-4 seizures in the past 6 months. These were longer than usual and he had a lot of movements during these. He repeatedly squated down and stood up and was walking during couple of them. He is unresponsive and very confused afterwards.  They lasted about a min or longer sometimes.  The last one was about 1 1/2 -2 months ago.  He did not have any auras.     He doesn't know of any trigger for his seizures. He did not miss his medication, was not sick, and was not sleep-deprived.    Leobardo is taking LVT 1500 mg bid and OXC 1200 mg bid. He denies side effects.    Current Outpatient Medications   Medication Sig Dispense Refill     amphetamine-dextroamphetamine (ADDERALL XR) 10 MG per 24 hr capsule Take 10 mg by mouth daily       fluticasone (FLONASE) 50 MCG/ACT nasal spray Spray 1 spray into both nostrils daily       ibuprofen (ADVIL/MOTRIN) 200 MG tablet        levETIRAcetam (KEPPRA) 1000 MG tablet Take 1.5 tablets (1,500 mg) by mouth 2 times daily 270 tablet 0     OXcarbazepine (TRILEPTAL) 600 MG tablet Take 2 tablets (1,200 mg) by mouth 2 times daily 120 tablet 0     SM CALCIUM 600+D3 600-800 MG-UNIT TABS TAKE 1 TABLET BY MOUTH ONCE DAILY 30 tablet 11        Review of Systems:  Lethargy / Tiredness:  No  Nausea / Vomiting:  No  Double Vision:  No  Sleepiness:  No  Depression:  No  Slowed Cognitive Function:  No  Memory Problems:  No  Dizziness:  He gets dizzy in the morning  Appetite Changes:  No  Blurred Vision:  No  Sleep Changes:  No  Respiratory: No shortness of breath and No cough  Cardiovascular: negative  Have you experienced a traumatic fall since your last visit:  NO    Other Issues:    Is patient safe to drive:  No      Assessment and Plan:    Intractable focal epilepsy:  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 Leobardo had 3-4 seizures in the past 6 months. He did not miss his medications and did not have any known triggers.     - Continue LVT 1500 mg  Bid  - Increase OXC to 9044-0757  - RTC in 6 months      As described above, I met with the patient face to face via video [employer] for 16 minutes and during this time counseling was greater than 50% of the visit time.  Geni Hickman MD       Video-Visit Details    Type of service:  Video Visit     Video End Time (time video stopped): 2:34    Originating Location (pt. Location): Home    Distant Location (provider location):  [facility] EPILEPSY CARE     Mode of Communication:  Video Conference via [employer]

## 2020-05-04 DIAGNOSIS — G40.109 FOCAL EPILEPSY (H): ICD-10-CM

## 2020-05-05 RX ORDER — LEVETIRACETAM 1000 MG/1
1500 TABLET ORAL
Qty: 270 TABLET | Refills: 0 | Status: SHIPPED | OUTPATIENT
Start: 2020-05-05 | End: 2020-08-25

## 2020-05-05 NOTE — TELEPHONE ENCOUNTER
levETIRAcetam (KEPPRA) 1000 MG tablet  Take 1.5 tablets (1,500 mg) by mouth 2 times daily   Last Written Prescription Date:  3/19/20  Last Fill Quantity: 270,   # refills: 0  Last Office Visit : 4/20/20 Virtual visit  Plan:  - Continue LVT 1500 mg  Bid  - Increase OXC to 5248-6654  - RTC in 6 months     Future Office visit:  none    LABS: creatinine if age > 65 yrs old    Refill to pharmacy.     Scheduling has been notified to contact the pt for 6 month follow up appointment.

## 2020-08-20 DIAGNOSIS — G40.109 FOCAL EPILEPSY (H): ICD-10-CM

## 2020-08-25 NOTE — TELEPHONE ENCOUNTER
LEVETIRACETAM 1000MG TABLET   Last Written Prescription Date:  5/5/2020  Last Fill Quantity: 270,   # refills: 0  Last Office Visit : 4/20/2020  Future Office visit:  None    Routing refill request to provider for review/approval because:  Is it ok to add refills to current order?   Also, would the Provider like the Pt to have a follow up appointment in Sept or Oct for 6 months office visit?   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 Leobardo had 3-4 seizures in the past 6 months. He did not miss his medications and did not have any known triggers.      - Continue LVT 1500 mg  Bid  - Increase OXC to 7471-2708  - RTC in 6 months        As described above, I met with the patient face to face via video [employer] for 16 minutes and during this time counseling was greater than 50% of the visit time.  Geni Hickman MD

## 2020-08-28 RX ORDER — LEVETIRACETAM 1000 MG/1
1500 TABLET ORAL 2 TIMES DAILY
Qty: 270 TABLET | Refills: 0 | Status: SHIPPED | OUTPATIENT
Start: 2020-08-28 | End: 2020-10-26

## 2020-08-28 NOTE — TELEPHONE ENCOUNTER
prescription refilled per protocol.  message sent to scheduling to arrange a visit with the MD for October 2020 (6 month follow up)

## 2020-10-26 ENCOUNTER — VIRTUAL VISIT (OUTPATIENT)
Dept: NEUROLOGY | Facility: CLINIC | Age: 20
End: 2020-10-26
Payer: COMMERCIAL

## 2020-10-26 DIAGNOSIS — G40.109 FOCAL EPILEPSY (H): Primary | ICD-10-CM

## 2020-10-26 RX ORDER — OXCARBAZEPINE 600 MG/1
TABLET, FILM COATED ORAL
Qty: 405 TABLET | Refills: 3 | Status: SHIPPED | OUTPATIENT
Start: 2020-10-26 | End: 2021-10-18

## 2020-10-26 RX ORDER — LEVETIRACETAM 1000 MG/1
1500 TABLET ORAL 2 TIMES DAILY
Qty: 270 TABLET | Refills: 3 | Status: SHIPPED | OUTPATIENT
Start: 2020-10-26 | End: 2021-10-18

## 2020-10-26 NOTE — PROGRESS NOTES
"Leobardo Leonard is a 20 year old male who is being evaluated via a billable video visit.      The patient has been notified of following:     \"This video visit will be conducted via a call between you and your physician/provider. We have found that certain health care needs can be provided without the need for an in-person physical exam.  This service lets us provide the care you need with a video conversation.  If a prescription is necessary we can send it directly to your pharmacy.  If lab work is needed we can place an order for that and you can then stop by our lab to have the test done at a later time.    Video visits are billed at different rates depending on your insurance coverage.  Please reach out to your insurance provider with any questions.    If during the course of the call the physician/provider feels a video visit is not appropriate, you will not be charged for this service.\"    Patient has given verbal consent for Video visit? Yes  How would you like to obtain your AVS? Mail a copy  If you are dropped from the video visit, the video invite should be resent to: Send to e-mail at: [email or web address]    Will anyone else be joining your video visit? Yes: Mother . How would they like to receive their invitation? Other e-mail: N/A        Video-Visit Details    Type of service:  Video Visit    Video Start Time: 908  Video End Time: 925    Originating Location (pt. Location): Home    Distant Location (provider location):  [facility] EPILEPSY CARE     Platform used for Video Visit: Other: [facility] Epilepsy Care Progress Note      Patient:  Leobardo Leonard  :  2000   Age:  20 year old   Today's Video Visit:  10/26/2020    Epilepsy Data:    Patient History  Primary Epileptologist/Provider: (P) Geni Hickman M.D.  Age of Onset: (P) 16 years  Other Relevant Dx/ Issues: (P) histry of binge drinking      Tests/Surgery History  Last EEG: (P) 17 frequent epileptiform " discharges over the left frontotemporal head region.  The patient also had a complex partial seizure of left frontotemporal onset.    Last MRI: (P) 3/24/17     Seizure Record  Current Visit Date: (P) 10/26/2020  Previous Visit Date: (P) 4/20/2020  Months since last visit: (P) 6  Seizure Type 1: (P) Tonic seizures  Description of Sz Type 1: (P) out of sleep; he suddenly became stiff and started gurgling and shaking all over  Seizure Type 2: (P) Complex partial seizures unspecified  Description of Sz Type 2: (P) he stares and left eye twitches and he swallows a lot and hands fumble, mainly left hand.       History of Present Illness:   Leobardo is accompanied by his mom during the video visit today. Leobardo had no seizures since January 2020. Prior to this he was having 3-4 seizures every 6 months.      He doesn't know of any trigger for his seizures. He is compliant with taking his medications regularly. He reports mild balance difficulty and double vision if he takes him medication without eating food with his medication     Leobardo is taking LVT 1500 mg bid and OXC 6981-3691 mg. .       Current Outpatient Medications   Medication Sig Dispense Refill     amphetamine-dextroamphetamine (ADDERALL XR) 10 MG per 24 hr capsule Take 10 mg by mouth daily       fluticasone (FLONASE) 50 MCG/ACT nasal spray Spray 1 spray into both nostrils daily       ibuprofen (ADVIL/MOTRIN) 200 MG tablet        levETIRAcetam (KEPPRA) 1000 MG tablet Take 1.5 tablets (1,500 mg) by mouth 2 times daily 270 tablet 0     OXcarbazepine (TRILEPTAL) 600 MG tablet Take 2 tabs am and 2 1/2 tabs pm 405 tablet 3     SM CALCIUM 600+D3 600-800 MG-UNIT TABS TAKE 1 TABLET BY MOUTH ONCE DAILY 30 tablet 11        Medication Notes:        AED Medication Compliance:  compliant most of the time  Using a pill box:  No    Review of Systems:  Lethargy / Tiredness:  No  Nausea / Vomiting:  No  Double Vision:  Yes  Sleepiness:  No  Depression:  No  Slowed Cognitive  Function:  No  Memory Problems:  No  Poor Balance:  Yes  Dizziness:  Yes  Appetite Changes:  No  Blurred Vision:  No  Decreased Libido:  No  Sleep Changes:  No  Behavioral Changes:  No  Skin: negative  Respiratory: No shortness of breath, dyspnea on exertion, cough, or hemoptysis  Cardiovascular: negative  Have you experienced a traumatic fall since your last visit: NO  Are these falls related to your seizures: Not Applicable    Other Issues:    Is patient safe to drive:  No    Exam:    General Appearance: Normal  Gait:  Not Examined  Attention Span:  Normal  Language/speech:  Normal, no aphasia or dysarthria  Extraocular Movements:  Normal  Coordination:  Normal  Facial Strength:  Normal    Assessment and Plan:   Intractable focal epilepsy:  focal impaired-aware seizures of probable left temporal onset. Leobardo had no seizures since January. Prior to that he was having 3-4 seizures/6 months. He did not miss his medications and did not have any known triggers. He is tolerating the increase in oxcarbazepine well.       - Continue LVT 1500 mg  Bid  - Continue OXC to 9168-7176  - Will check AED level   - RTC in 6 months        As described above, I met with the patient for 16 minutes and during this time counseling was within 50% of the visit time.  I evaluated the patient with Dr. García who agrees with my assessment and plan above.   Ashley Silva DO  Neurology PGY-3   3640    I saw and evaluated the patient and discussed the plan of care with Dr. Ashley Silva . I have reviewed her note and agree with the findings, impression and plan.   Geni Hickman MD

## 2020-10-26 NOTE — LETTER
"10/26/2020       RE: Leobardo Leonard  : 2000   MRN: [id number]      Dear Colleague,    Thank you for referring your patient, Leobardo Leonard, to the [facility] EPILEPSY CARE at [facility]. Please see a copy of my visit note below.    Leobardo Leonard is a 20 year old male who is being evaluated via a billable video visit.      The patient has been notified of following:     \"This video visit will be conducted via a call between you and your physician/provider. We have found that certain health care needs can be provided without the need for an in-person physical exam.  This service lets us provide the care you need with a video conversation.  If a prescription is necessary we can send it directly to your pharmacy.  If lab work is needed we can place an order for that and you can then stop by our lab to have the test done at a later time.    Video visits are billed at different rates depending on your insurance coverage.  Please reach out to your insurance provider with any questions.    If during the course of the call the physician/provider feels a video visit is not appropriate, you will not be charged for this service.\"    Patient has given verbal consent for Video visit? Yes  How would you like to obtain your AVS? Mail a copy  If you are dropped from the video visit, the video invite should be resent to: Send to e-mail at: [email or web address]    Will anyone else be joining your video visit? Yes: Mother . How would they like to receive their invitation? Other e-mail: N/A        Video-Visit Details    Type of service:  Video Visit    Video Start Time: 908  Video End Time: 925    Originating Location (pt. Location): Home    Distant Location (provider location):  [facility] EPILEPSY CARE     Platform used for Video Visit: Other: N[employer]      [facility] Epilepsy Care Progress Note      Patient:  Leobardo Leonard  :  2000   Age:  20 year old   Today's " Video Visit:  10/26/2020    Epilepsy Data:    Patient History  Primary Epileptologist/Provider: (ARBEN) Geni Hickman M.D.  Age of Onset: (P) 16 years  Other Relevant Dx/ Issues: (P) histry of binge drinking      Tests/Surgery History  Last EEG: (P) 4/18/17 frequent epileptiform discharges over the left frontotemporal head region.  The patient also had a complex partial seizure of left frontotemporal onset.    Last MRI: (P) 3/24/17     Seizure Record  Current Visit Date: (P) 10/26/2020  Previous Visit Date: (P) 4/20/2020  Months since last visit: (P) 6  Seizure Type 1: (P) Tonic seizures  Description of Sz Type 1: (P) out of sleep; he suddenly became stiff and started gurgling and shaking all over  Seizure Type 2: (P) Complex partial seizures unspecified  Description of Sz Type 2: (P) he stares and left eye twitches and he swallows a lot and hands fumble, mainly left hand.       History of Present Illness:   Leobardo is accompanied by his mom during the video visit today. Leobardo had no seizures since January 2020. Prior to this he was having 3-4 seizures every 6 months.      He doesn't know of any trigger for his seizures. He is compliant with taking his medications regularly. He reports mild balance difficulty and double vision if he takes him medication without eating food with his medication     Leobardo is taking LVT 1500 mg bid and OXC 0914-1708 mg. .       Current Outpatient Medications   Medication Sig Dispense Refill     amphetamine-dextroamphetamine (ADDERALL XR) 10 MG per 24 hr capsule Take 10 mg by mouth daily       fluticasone (FLONASE) 50 MCG/ACT nasal spray Spray 1 spray into both nostrils daily       ibuprofen (ADVIL/MOTRIN) 200 MG tablet        levETIRAcetam (KEPPRA) 1000 MG tablet Take 1.5 tablets (1,500 mg) by mouth 2 times daily 270 tablet 0     OXcarbazepine (TRILEPTAL) 600 MG tablet Take 2 tabs am and 2 1/2 tabs pm 405 tablet 3     SM CALCIUM 600+D3 600-800 MG-UNIT TABS TAKE 1 TABLET BY MOUTH  ONCE DAILY 30 tablet 11        Medication Notes:        AED Medication Compliance:  compliant most of the time  Using a pill box:  No    Review of Systems:  Lethargy / Tiredness:  No  Nausea / Vomiting:  No  Double Vision:  Yes  Sleepiness:  No  Depression:  No  Slowed Cognitive Function:  No  Memory Problems:  No  Poor Balance:  Yes  Dizziness:  Yes  Appetite Changes:  No  Blurred Vision:  No  Decreased Libido:  No  Sleep Changes:  No  Behavioral Changes:  No  Skin: negative  Respiratory: No shortness of breath, dyspnea on exertion, cough, or hemoptysis  Cardiovascular: negative  Have you experienced a traumatic fall since your last visit: NO  Are these falls related to your seizures: Not Applicable    Other Issues:    Is patient safe to drive:  No    Exam:    General Appearance: Normal  Gait:  Not Examined  Attention Span:  Normal  Language/speech:  Normal, no aphasia or dysarthria  Extraocular Movements:  Normal  Coordination:  Normal  Facial Strength:  Normal    Assessment and Plan:   Intractable focal epilepsy:  focal impaired-aware seizures of probable left temporal onset. Leobardo had no seizures since January. Prior to that he was having 3-4 seizures/6 months. He did not miss his medications and did not have any known triggers. He is tolerating the increase in oxcarbazepine well.       - Continue LVT 1500 mg  Bid  - Continue OXC to 5437-2099  - Will check AED level   - RTC in 6 months        As described above, I met with the patient for 16 minutes and during this time counseling was within 50% of the visit time.  I evaluated the patient with Dr. García who agrees with my assessment and plan above.   Ashley Silva DO  Neurology PGY-3   6332    I saw and evaluated the patient and discussed the plan of care with Dr. Ashley Silva . I have reviewed her note and agree with the findings, impression and plan.   Geni Hickman MD                      Again, thank you for allowing me to participate in the care of  your patient.      Sincerely,    Geni Hickman MD

## 2020-11-09 ENCOUNTER — TRANSFERRED RECORDS (OUTPATIENT)
Dept: HEALTH INFORMATION MANAGEMENT | Facility: CLINIC | Age: 20
End: 2020-11-09

## 2020-11-09 LAB
KEPPRA (LEVETIRACETAM) LEVEL: 17.7 MCG/ML (ref 12–46)
OXCARBAZEPINE METABOLITE (MHC) S: 25 MCG/ML (ref 3–35)

## 2020-11-10 DIAGNOSIS — G40.109 FOCAL EPILEPSY (H): ICD-10-CM

## 2021-10-14 DIAGNOSIS — G40.109 FOCAL EPILEPSY (H): ICD-10-CM

## 2021-10-18 RX ORDER — OXCARBAZEPINE 600 MG/1
TABLET, FILM COATED ORAL
Qty: 135 TABLET | Refills: 0 | Status: SHIPPED | OUTPATIENT
Start: 2021-10-18 | End: 2021-11-01

## 2021-10-18 RX ORDER — LEVETIRACETAM 1000 MG/1
1500 TABLET ORAL 2 TIMES DAILY
Qty: 90 TABLET | Refills: 0 | Status: SHIPPED | OUTPATIENT
Start: 2021-10-18 | End: 2021-11-01

## 2021-10-18 NOTE — TELEPHONE ENCOUNTER
LEVETIRACETAM 1000MG TABLET      Last Written Prescription Date:  10/26/20  Last Fill Quantity: 270,   # refills: 3  Last Office Visit : 10/26/20 recommended 6 month follow up  Future Office visit:  None scheduled  Under age 65, no creatinine needed  Filling per [facility] medication refill protocols - seizure medications.  Not all labs required.  30 day refill provided per protocol, routed to clinic scheduling for follow up      OXCARBAZEPINE 600MG TABLET      Last Written Prescription Date:  10/26/20  Last Fill Quantity: 405,   # refills: 3  Last Office Visit : 10/26/20 recommended 6 month follow up  Future Office visit:  None scheduled    Routing refill request to provider for review/approval because:  Most recent sodium 10/21/19 in care everywhere  Nothing more recent in EPIC nor media

## 2021-11-01 ENCOUNTER — VIRTUAL VISIT (OUTPATIENT)
Dept: NEUROLOGY | Facility: CLINIC | Age: 21
End: 2021-11-01
Payer: COMMERCIAL

## 2021-11-01 DIAGNOSIS — G40.109 FOCAL EPILEPSY (H): Primary | ICD-10-CM

## 2021-11-01 RX ORDER — OXCARBAZEPINE 600 MG/1
TABLET, FILM COATED ORAL
Qty: 405 TABLET | Refills: 3 | Status: SHIPPED | OUTPATIENT
Start: 2021-11-01 | End: 2022-10-31

## 2021-11-01 RX ORDER — LEVETIRACETAM 1000 MG/1
1500 TABLET ORAL 2 TIMES DAILY
Qty: 270 TABLET | Refills: 3 | Status: SHIPPED | OUTPATIENT
Start: 2021-11-01 | End: 2022-10-31

## 2021-11-01 NOTE — LETTER
2021       RE: Leobardo Leonard  : 2000   MRN: [id number]      Dear Colleague,    Thank you for referring your patient, Leobardo Leonard, to the [facility] EPILEPSY CARE at [facility]. Please see a copy of my visit note below.    [facility] Epilepsy Care Progress Note      Patient:  Leobardo Leonard  :  2000   Age:  21 year old   Today's Virtual Visit:  2021    Epilepsy Data:    Patient History  Primary Epileptologist/Provider: Geni Hickman M.D.  Age of Onset: 16 years  Other Relevant Dx/ Issues: histry of binge drinking     Tests/Surgery History  Last EE17 frequent epileptiform discharges over the left frontotemporal head region.  The patient also had a complex partial seizure of left frontotemporal onset.    Last MRI: 3/24/17    Seizure Record  Current Visit Date: 21  Seizure Type 1: Tonic seizures  Description of Sz Type 1: out of sleep; he suddenly became stiff and started gurgling and shaking all over  Seizure Type 2: Complex partial seizures unspecified  Description of Sz Type 2: he stares and left eye twitches and he swallows a lot and hands fumble, mainly left hand.     History of Present Illness:    Leobardo is participating in this virtual visit for follow-up on his epilepsy.  He is is accompanied by his mom.  He was last seen 2020.  He has not had any seizures since last visit.  His last seizure was 2020.  His oxcarbazepine was increased by 300 mg in the evening in 2020.  He is taking oxcarbazepine 5016-4830 and levetiracetam 1500 mg twice daily.  He denies irritability, depression, anxiety, drowsiness or tiredness.  He experiences occasional dizziness, however he does not think it's significant.    Current Outpatient Medications   Medication Sig Dispense Refill     amphetamine-dextroamphetamine (ADDERALL XR) 10 MG per 24 hr capsule Take 10 mg by mouth daily       fluticasone  (FLONASE) 50 MCG/ACT nasal spray Spray 1 spray into both nostrils daily       ibuprofen (ADVIL/MOTRIN) 200 MG tablet        levETIRAcetam (KEPPRA) 1000 MG tablet Take 1.5 tablets (1,500 mg) by mouth 2 times daily For additional refills, please schedule a follow-up appointment at [phone] 90 tablet 0     OXcarbazepine (TRILEPTAL) 600 MG tablet TAKE 2 TABLETS BY MOUTH EVERY MORNING AND 2 AND 1/2 TABLETS EVERY EVENING. For additional refills, please schedule a follow-up appointment at [phone] 135 tablet 0     SM CALCIUM 600+D3 600-800 MG-UNIT TABS TAKE 1 TABLET BY MOUTH ONCE DAILY 30 tablet 11        Other Issues:    Is patient safe to drive:  yes    Exam:    There were no vitals taken for this visit.     Wt Readings from Last 5 Encounters:   10/14/19 174 lb (78.9 kg)   06/17/19 174 lb 9.6 oz (79.2 kg) (76 %, Z= 0.70)*   12/31/18 179 lb 6.4 oz (81.4 kg) (82 %, Z= 0.90)*   07/19/18 165 lb 3.2 oz (74.9 kg) (69 %, Z= 0.50)*   01/18/18 168 lb 6.4 oz (76.4 kg) (75 %, Z= 0.68)*     * Growth percentiles are based on [facility] (Boys, 2-20 Years) data.       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 Leobardo had no seizures since last visit. Prior to that he was having 3-4 seizures/6 months.  He is taking levetiracetam 1500 mg twice daily and oxcarbazepine 7267-6275.  He has occasional dizziness, but it is not bothersome.     - Continue LVT 1500 mg Bid  - Continue OXC to 8486-8492  - Obtain AED levels   - RTC in 1 yr.      As described above, I met with the patient and his family for 7 minutes and during this time counseling was within 50% of the visit time.This note was created in part by the use of Dragon voice recognition system. Inadvertent grammatical errors and typographical errors may still exist.  Geni Hickman MD    Leobardo is a 21 year old who is being evaluated via a billable video visit.      How would you like to obtain your AVS? Mail a  copy  If the video visit is dropped, the invitation should be resent by: Text to cell phone: Call [phone]  Will anyone else be joining your video visit?     Video Start Time: 8:16  Video-Visit Details    Type of service:  Video Visit    Video End Time:8:23    Originating Location (pt. Location): Home    Distant Location (provider location):  [facility] EPILEPSY CARE     Platform used for Video Visit: [employer]      Again, thank you for allowing me to participate in the care of your patient.      Sincerely,    Geni Hickman MD

## 2021-11-01 NOTE — PROGRESS NOTES
[facility] Epilepsy Care Progress Note      Patient:  Leobardo Leonard  :  2000   Age:  21 year old   Today's Virtual Visit:  2021    Epilepsy Data:    Patient History  Primary Epileptologist/Provider: Geni Hickman M.D.  Age of Onset: 16 years  Other Relevant Dx/ Issues: histry of binge drinking     Tests/Surgery History  Last EE17 frequent epileptiform discharges over the left frontotemporal head region.  The patient also had a complex partial seizure of left frontotemporal onset.    Last MRI: 3/24/17    Seizure Record  Current Visit Date: 21  Seizure Type 1: Tonic seizures  Description of Sz Type 1: out of sleep; he suddenly became stiff and started gurgling and shaking all over  Seizure Type 2: Complex partial seizures unspecified  Description of Sz Type 2: he stares and left eye twitches and he swallows a lot and hands fumble, mainly left hand.     History of Present Illness:    Leobardo is participating in this virtual visit for follow-up on his epilepsy.  He is is accompanied by his mom.  He was last seen 2020.  He has not had any seizures since last visit.  His last seizure was 2020.  His oxcarbazepine was increased by 300 mg in the evening in 2020.  He is taking oxcarbazepine 6401-6541 and levetiracetam 1500 mg twice daily.  He denies irritability, depression, anxiety, drowsiness or tiredness.  He experiences occasional dizziness, however he does not think it's significant.    Current Outpatient Medications   Medication Sig Dispense Refill     amphetamine-dextroamphetamine (ADDERALL XR) 10 MG per 24 hr capsule Take 10 mg by mouth daily       fluticasone (FLONASE) 50 MCG/ACT nasal spray Spray 1 spray into both nostrils daily       ibuprofen (ADVIL/MOTRIN) 200 MG tablet        levETIRAcetam (KEPPRA) 1000 MG tablet Take 1.5 tablets (1,500 mg) by mouth 2 times daily For additional refills, please schedule a follow-up appointment at [phone]  tablet 0     OXcarbazepine (TRILEPTAL) 600 MG tablet TAKE 2 TABLETS BY MOUTH EVERY MORNING AND 2 AND 1/2 TABLETS EVERY EVENING. For additional refills, please schedule a follow-up appointment at [phone] 135 tablet 0     SM CALCIUM 600+D3 600-800 MG-UNIT TABS TAKE 1 TABLET BY MOUTH ONCE DAILY 30 tablet 11        Other Issues:    Is patient safe to drive:  yes    Exam:    There were no vitals taken for this visit.     Wt Readings from Last 5 Encounters:   10/14/19 174 lb (78.9 kg)   06/17/19 174 lb 9.6 oz (79.2 kg) (76 %, Z= 0.70)*   12/31/18 179 lb 6.4 oz (81.4 kg) (82 %, Z= 0.90)*   07/19/18 165 lb 3.2 oz (74.9 kg) (69 %, Z= 0.50)*   01/18/18 168 lb 6.4 oz (76.4 kg) (75 %, Z= 0.68)*     * Growth percentiles are based on CDC (Boys, 2-20 Years) data.       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 Leobardo had no seizures since last visit. Prior to that he was having 3-4 seizures/6 months.  He is taking levetiracetam 1500 mg twice daily and oxcarbazepine 5994-3160.  He has occasional dizziness, but it is not bothersome.     - Continue LVT 1500 mg Bid  - Continue OXC to 4384-4572  - Obtain AED levels   - RTC in 1 yr.      As described above, I met with the patient and his family for 7 minutes and during this time counseling was within 50% of the visit time.This note was created in part by the use of Dragon voice recognition system. Inadvertent grammatical errors and typographical errors may still exist.  Geni Hickman MD    Leobardo is a 21 year old who is being evaluated via a billable video visit.      How would you like to obtain your AVS? Mail a copy  If the video visit is dropped, the invitation should be resent by: Text to cell phone: Call [phone]  Will anyone else be joining your video visit?     Video Start Time: 8:16  Video-Visit Details    Type of service:  Video Visit    Video End Time:8:23    Originating Location (pt.  Location): Home    Distant Location (provider location):  [facility] EPILEPSY CARE     Platform used for Video Visit: Mary

## 2022-10-31 ENCOUNTER — VIRTUAL VISIT (OUTPATIENT)
Dept: NEUROLOGY | Facility: CLINIC | Age: 22
End: 2022-10-31
Payer: COMMERCIAL

## 2022-10-31 VITALS — HEIGHT: 71 IN | WEIGHT: 167 LBS | BODY MASS INDEX: 23.38 KG/M2

## 2022-10-31 DIAGNOSIS — G40.109 FOCAL EPILEPSY (H): ICD-10-CM

## 2022-10-31 RX ORDER — LEVETIRACETAM 1000 MG/1
1500 TABLET ORAL 2 TIMES DAILY
Qty: 270 TABLET | Refills: 3 | Status: SHIPPED | OUTPATIENT
Start: 2022-10-31 | End: 2023-11-21

## 2022-10-31 RX ORDER — OXCARBAZEPINE 600 MG/1
TABLET, FILM COATED ORAL
Qty: 405 TABLET | Refills: 3 | Status: SHIPPED | OUTPATIENT
Start: 2022-10-31 | End: 2023-11-21

## 2022-10-31 ASSESSMENT — PAIN SCALES - GENERAL: PAINLEVEL: NO PAIN (0)

## 2022-10-31 ASSESSMENT — PATIENT HEALTH QUESTIONNAIRE - PHQ9: SUM OF ALL RESPONSES TO PHQ QUESTIONS 1-9: 0

## 2022-10-31 NOTE — PROGRESS NOTES
Leobardo is a 22 year old who is being evaluated via a billable video visit.      How would you like to obtain your AVS? MyChart  If the video visit is dropped, the invitation should be resent by: Send to e-mail at: [email or web address]  Will anyone else be joining your video visit?     NADIA Ag    Video-Visit Details    Video Start Time: 8:19    Type of service:  Video Visit    Video End Time: 8:27    Originating Location (pt. Location): Home        Distant Location (provider location):  On-site    Platform used for Video Visit: Dasient     [facility]      Patient:  Leobardo Leonard  :  2000   Age:  22 year old   Today's Virtual Visit:  10/31/2022    Epilepsy Data:     Patient History  Primary Epileptologist/Provider: Geni Hickman M.D.  Age of Onset: 16 years  Other Relevant Dx/ Issues: histry of binge drinking     Tests/Surgery History  Last EE17 frequent epileptiform discharges over the left frontotemporal head region.  The patient also had a complex partial seizure of left frontotemporal onset.    Last MRI: 3/24/17    Seizure Record  Current Visit Date: 10/31/22  Previous Visit Date: 21  Months since last visit: 11.96  Seizure Type 1: Tonic seizures  Description of Sz Type 1: out of sleep; he suddenly became stiff and started gurgling and shaking all over.  Number of seizures since last visit: 0  Seizure Type 2: Complex partial seizures unspecified  Description of Sz Type 2: he stares and left eye twitches and he swallows a lot and hands fumble, mainly left hand.    Number of seizures since last visit: 0    History of Present Illness:   Leobardo is participating in this virtual visit for a follow up on his epilepsy. He is accompanied by his mom. He has been seizure-free for 3 yrs in 2023.  He is taking levetiracetam 1500 mg bid and oxcarbazepine 8028-9354. He has occasional dizziness. Denies imbalnace, mood swings,  "depression, anxiety.     No illnesses, ER visit or hospitalization since last visit.       Current Outpatient Medications   Medication Sig Dispense Refill     amphetamine-dextroamphetamine (ADDERALL XR) 10 MG per 24 hr capsule Take 10 mg by mouth daily       ibuprofen (ADVIL/MOTRIN) 200 MG tablet        SM CALCIUM 600+D3 600-800 MG-UNIT TABS TAKE 1 TABLET BY MOUTH ONCE DAILY 30 tablet 11     fluticasone (FLONASE) 50 MCG/ACT nasal spray Spray 1 spray into both nostrils daily (Patient not taking: Reported on 10/31/2022)       levETIRAcetam (KEPPRA) 1000 MG tablet Take 1.5 tablets (1,500 mg) by mouth 2 times daily 270 tablet 3     OXcarbazepine (TRILEPTAL) 600 MG tablet TAKE 2 TABLETS BY MOUTH EVERY MORNING AND 2 AND 1/2 TABLETS EVERY EVENING. 405 tablet 3        Medication Notes:        AED Medication Compliance:  compliant most of the time    Other Issues:    Is patient safe to drive:  Yes    Exam:    Ht 5' 11\" (180.3 cm)   Wt 167 lb (75.8 kg)   BMI 23.29 kg/m       Wt Readings from Last 5 Encounters:   10/31/22 167 lb (75.8 kg)   10/14/19 174 lb (78.9 kg)   06/17/19 174 lb 9.6 oz (79.2 kg) (76 %, Z= 0.70)*   12/31/18 179 lb 6.4 oz (81.4 kg) (82 %, Z= 0.90)*   07/19/18 165 lb 3.2 oz (74.9 kg) (69 %, Z= 0.50)*     * Growth percentiles are based on [facility] (Boys, 2-20 Years) data.     Alert, awake, NAD, no aphasia or dysarthria, EOMI, face symmetric, moves upper extremities against gravity.     Assessment and Plan:   Focal epilepsy:  focal impaired-aware seizures of probable left temporal onset. Leobardo had no seizures since last visit. He is gonna be 3 years seizure-free in Feb. Previously he had 3-4 seizures/6 months.  He is taking levetiracetam 1500 mg twice daily and oxcarbazepine 9881-8761.  He has occasional dizziness, but it is not bothersome. Denies other side effects.      - Continue LVT 1500 mg Bid and OXC to 4259-1770  - Obtain AED levels   - RTC in 1 yr.      As described above, I met with the patient and his " mom for 7 1/2  minutes and during this time counseling was greater than 50% of the visit time.  Geni Hickman MD

## 2022-10-31 NOTE — LETTER
10/31/2022       RE: Leobardo Leonard  : 2000   MRN: [id number]      Dear Colleague,    Thank you for referring your patient, Leobardo Leonard, to the [facility] EPILEPSY CARE at [facility]. Please see a copy of my visit note below.    Leobardo is a 22 year old who is being evaluated via a billable video visit.      How would you like to obtain your AVS? MyChart  If the video visit is dropped, the invitation should be resent by: Send to e-mail at: [email or web address]  Will anyone else be joining your video visit?     NADIA Ag    Video-Visit Details    Video Start Time: 8:19    Type of service:  Video Visit    Video End Time: 8:27    Originating Location (pt. Location): Home        Distant Location (provider location):  On-site    Platform used for Video Visit: [facility] Epilepsy Care Progress Note      Patient:  Leobardo Leonard  :  2000   Age:  22 year old   Today's Virtual Visit:  10/31/2022    Epilepsy Data:     Patient History  Primary Epileptologist/Provider: Geni Hickman M.D.  Age of Onset: 16 years  Other Relevant Dx/ Issues: histry of binge drinking     Tests/Surgery History  Last EE17 frequent epileptiform discharges over the left frontotemporal head region.  The patient also had a complex partial seizure of left frontotemporal onset.    Last MRI: 3/24/17    Seizure Record  Current Visit Date: 10/31/22  Previous Visit Date: 21  Months since last visit: 11.96  Seizure Type 1: Tonic seizures  Description of Sz Type 1: out of sleep; he suddenly became stiff and started gurgling and shaking all over.  Number of seizures since last visit: 0  Seizure Type 2: Complex partial seizures unspecified  Description of Sz Type 2: he stares and left eye twitches and he swallows a lot and hands fumble, mainly left hand.    Number of seizures since last visit: 0    History of Present Illness:  "  Leobardo is participating in this virtual visit for a follow up on his epilepsy. He is accompanied by his mom. He has been seizure-free for 3 yrs in Feb 2023.  He is taking levetiracetam 1500 mg bid and oxcarbazepine 8720-0200. He has occasional dizziness. Denies imbalnace, mood swings, depression, anxiety.     No illnesses, ER visit or hospitalization since last visit.       Current Outpatient Medications   Medication Sig Dispense Refill     amphetamine-dextroamphetamine (ADDERALL XR) 10 MG per 24 hr capsule Take 10 mg by mouth daily       ibuprofen (ADVIL/MOTRIN) 200 MG tablet        SM CALCIUM 600+D3 600-800 MG-UNIT TABS TAKE 1 TABLET BY MOUTH ONCE DAILY 30 tablet 11     fluticasone (FLONASE) 50 MCG/ACT nasal spray Spray 1 spray into both nostrils daily (Patient not taking: Reported on 10/31/2022)       levETIRAcetam (KEPPRA) 1000 MG tablet Take 1.5 tablets (1,500 mg) by mouth 2 times daily 270 tablet 3     OXcarbazepine (TRILEPTAL) 600 MG tablet TAKE 2 TABLETS BY MOUTH EVERY MORNING AND 2 AND 1/2 TABLETS EVERY EVENING. 405 tablet 3        Medication Notes:        AED Medication Compliance:  compliant most of the time    Other Issues:    Is patient safe to drive:  Yes    Exam:    Ht 5' 11\" (180.3 cm)   Wt 167 lb (75.8 kg)   BMI 23.29 kg/m       Wt Readings from Last 5 Encounters:   10/31/22 167 lb (75.8 kg)   10/14/19 174 lb (78.9 kg)   06/17/19 174 lb 9.6 oz (79.2 kg) (76 %, Z= 0.70)*   12/31/18 179 lb 6.4 oz (81.4 kg) (82 %, Z= 0.90)*   07/19/18 165 lb 3.2 oz (74.9 kg) (69 %, Z= 0.50)*     * Growth percentiles are based on [facility] (Boys, 2-20 Years) data.     Alert, awake, NAD, no aphasia or dysarthria, EOMI, face symmetric, moves upper extremities against gravity.     Assessment and Plan:   Focal epilepsy:  focal impaired-aware seizures of probable left temporal onset. Leobardo had no seizures since last visit. He is gonna be 3 years seizure-free in Feb. Previously he had 3-4 seizures/6 months.  He is taking " levetiracetam 1500 mg twice daily and oxcarbazepine 0901-8105.  He has occasional dizziness, but it is not bothersome. Denies other side effects.      - Continue LVT 1500 mg Bid and OXC to 7087-6863  - Obtain AED levels   - RTC in 1 yr.      As described above, I met with the patient and his mom for 7 1/2  minutes and during this time counseling was greater than 50% of the visit time.  Geni Hicmkan MD                                                 Again, thank you for allowing me to participate in the care of your patient.      Sincerely,    Geni Hickman MD

## 2023-11-21 ENCOUNTER — VIRTUAL VISIT (OUTPATIENT)
Dept: NEUROLOGY | Facility: CLINIC | Age: 23
End: 2023-11-21
Payer: COMMERCIAL

## 2023-11-21 VITALS — HEIGHT: 71 IN | BODY MASS INDEX: 23.1 KG/M2 | WEIGHT: 165 LBS

## 2023-11-21 DIAGNOSIS — G40.109 FOCAL EPILEPSY (H): Primary | ICD-10-CM

## 2023-11-21 RX ORDER — LEVETIRACETAM 1000 MG/1
1500 TABLET ORAL 2 TIMES DAILY
Qty: 270 TABLET | Refills: 3 | Status: SHIPPED | OUTPATIENT
Start: 2023-11-21

## 2023-11-21 RX ORDER — OXCARBAZEPINE 600 MG/1
TABLET, FILM COATED ORAL
Qty: 405 TABLET | Refills: 3 | Status: SHIPPED | OUTPATIENT
Start: 2023-11-21

## 2023-11-21 ASSESSMENT — PATIENT HEALTH QUESTIONNAIRE - PHQ9: SUM OF ALL RESPONSES TO PHQ QUESTIONS 1-9: 0

## 2023-11-21 ASSESSMENT — PAIN SCALES - GENERAL: PAINLEVEL: NO PAIN (0)

## 2023-11-21 NOTE — PATIENT INSTRUCTIONS
Continue your current medications of leveiracetam 1500mg twice per day and oxcarbazepine 1200mg in the morning and 1500mg in the evening. Refills have been sent.    Alcohol can lower the seizure threshold (make you more susceptible to breakthrough seizures) and can make any potential side effects of your medications worse. Minimizing alcohol use is recommended.    We will check labs for medication monitoring. The lab orders will be mailed to you.    Follow up with myself or Dr. Hickman in one year. Call sooner with any questions, concerns, or breakthrough seizures.    Seizure safety precautions are provided below for reference:  [place] regulations on driving were reviewed with you and you should not drive until you are three months seizure/spell free.You are prohibited from operating a motor vehicle within 3 months following any seizure or other episode with sudden unconsciousness or inability to sit up, and that it is required to report most recent seizure to the DMV within 30 days after the event.    Avoid any activities that might lead to self-injury or injury of others, within 3 months following any seizure with impaired awareness or impaired motor control such activities include but are not limited to operating power tools, operating firearms, climbing ladders/trees/exposure to heights from which he might fall. Do not operate power tools or heavy machinery and equipment.  Do not swim alone, bathe in any form of tub, such as bathtub, jacuzzi, or hot tub unless there is a responsible adult close by to provide assistance in case she has a seizure and drowns. Avoid work on hot surfaces such stoves, ovens, or with scalding hot water.

## 2023-11-21 NOTE — NURSING NOTE
Is the patient currently in the state of [place]? YES    Visit mode:VIDEO    If the visit is dropped, the patient can be reconnected by: VIDEO VISIT: Text to cell phone:   Telephone Information:   Mobile [phone]       Will anyone else be joining the visit? Pt's mother Geeta may be joining.   (If patient encounters technical issues they should call [phone]    How would you like to obtain your AVS? MyChart    Are changes needed to the allergy or medication list? No    Reason for visit: RECHECK    Medications and allergies have been reviewed.     Isra HOANG

## 2023-11-21 NOTE — PROGRESS NOTES
Virtual Visit Details  Patient Consented to Virtual Visit.    Type of service:  Video Visit   Video Start Time: 8:33 AM  Video End Time:8:46 AM    Originating Location (pt. Location): Home in [place]    Distant Location (provider location):  On-site  Platform used for Video Visit: [facility] Progress Note    Patient:  Leobardo Leonard  :  2000   Age:  23 year old   Today's Office Visit:  2023  Chief Complaint: Follow up seizures    Chief Complaint   Patient presents with    RECHECK         Epilepsy Data:  Leobardo is a 16-year-old, ambidextrous man whose seizures started on 2017.  Mom witnessed his seizure, which occurred out of sleep; he suddenly became stiff and started gurgling and shaking all over.  This lasted about 2 minutes.  He was confused postictally.  The patient binge drink on that day.  He had an EEG on 3/24/17, which was abnormal showing focal slowing and frequent sharp transients and spike-and-slow waves over the left frontotemporal head region, maximal at F7.  He was recommended to start lamotrigine by Dr. Calvert, but the patient wanted to get a second opinion. He had a seizure during EEG at [facility].  Mom admitted that she noted similar episode the day before but didn't know it's a seizure.  The patient does not drink regularly, but he does binge drinking every time he drinks.  We started him on oxcarbazepine in 2017.      History of Present Illness: Mr. Leonard was last seen by Dr. 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 He remained seizure free on levetiracetam 1500mg BID and oxcarbazepine 1200-1500mg, which he was to continue.     Today, Mr Leonard presents on the video visit with his mother. His last seizure was in 2020.     Seizures since last seen: No   Medication side effects: He states he will get dizzy after he takes his medication which is more  of a lightheadedness, short in duration and tolerable.   Any recent infection: States he has been doing well, denies any new diagnoses in the last year.   Missed doses of medication: Takes his medication 930AM and 930PM - denies any missed doses.   Using a Pillbox: Yes   change: Denies  Non-AED medication changes: Denies   Decreased sleep: 7 to 8 hours of sleep per night, and feels refreshed.  Alcohol or illicit drug use: Alcohol use is one to two times per week, four beers in a sitting. He denies illicit drug use.  Increased stress: States this has been fine, no issues with stress  Mood Concerns: Denies any depression, anxiety, or mood concerns  Driving: He does drive, without any issues.   Triggers for seizures: Denies any known triggers.     He was given the opportunity to ask questions which I answered to the best of my ability.       Diagnostic studies reviewed as below:  MRI head w/o 3/14/2017:  Impression:    1. Mildly low-lying cerebellar tonsils.    2. Otherwise unremarkable noncontrast brain MRI for age.     3 hour video EEG 4/18/2017:  IMPRESSION:  This is an abnormal video EEG due to the presence of focal delta slowing as well as very frequent epileptiform discharges over the left frontotemporal head region.  The patient also had a complex partial seizure of left frontotemporal onset.  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     Levetiracetam  10.0 - 40.0 mcg/mL 27.3   Comment:  -------------------ADDITIONAL INFORMATION-------------------  This test was developed and its performance characteristics  determined by [facility] in a manner consistent with CLIA  requirements. This test has not been cleared or approved by  the U.S. Food and Drug Administration.    Test Performed by:  [facility]  [address] [place] [address]  : Markie Figueredo M.D. Ph.D.; CLIA# [id number]   Resulting [facility]ORATORIES     Specimen Collected: 12/02/22     Oxycarbazepine Metabolite (MHC)  10 - 35 mcg/mL 25   Comment:  -------------------ADDITIONAL INFORMATION-------------------  This test was developed and its performance characteristics  determined by [facility] in a manner consistent with CLIA  requirements. This test has not been cleared or approved by  the U.S. Food and Drug Administration.    Test Performed by:  [facility]  [address] [place] [id number]  : Markie Figueredo M.D. Ph.D.; CLIA# [id number]   [facility]     Specimen Collected: 12/02/22     Sodium  136 - 146 mmol/L 137   Resulting [facility]     Specimen Collected: 12/02/22     Histories:    Past Medical History:   Diagnosis Date    ADHD (attention deficit hyperactivity disorder)        Past Surgical History:   Procedure Laterality Date    ADENOIDECTOMY      MYRINGOTOMY, INSERT TUBE, COMBINED      TONSILLECTOMY         Social History     Socioeconomic History    Marital status: Single     Spouse name: None    Number of children: None    Years of education: None    Highest education level: None   Tobacco Use    Smoking status: Former    Smokeless tobacco: Current     Types: Chew   Substance and Sexual Activity    Alcohol use: Yes     Comment: beer once in awhile    Drug use: No     He is ambidextrous. He has black labrador retrievers.        No family history on file.    There is no problem list on file for this patient.      Current Outpatient Rx   Medication Sig Dispense Refill    fluticasone (FLONASE) 50 MCG/ACT nasal spray Spray 1 spray into both nostrils daily      ibuprofen (ADVIL/MOTRIN) 200 MG tablet       levETIRAcetam (KEPPRA) 1000 MG tablet Take 1.5 tablets (1,500 mg) by mouth 2 times daily 270 tablet 3  "   multivitamin w/minerals (MULTI-VITAMIN) tablet Take 1 tablet by mouth every morning      OXcarbazepine (TRILEPTAL) 600 MG tablet TAKE 2 TABLETS BY MOUTH EVERY MORNING AND 2 AND 1/2 TABLETS EVERY EVENING. 405 tablet 3    SM CALCIUM 600+D3 600-800 MG-UNIT TABS TAKE 1 TABLET BY MOUTH ONCE DAILY 30 tablet 11         Allergies:       Allergies   Allergen Reactions    Cephalexin      Other reaction(s): Unknown Reaction        Review of Systems:  Lethargy / Tiredness:  No  Sleepiness:  No  Depression:  No  Dizziness:  Yes - as above  Have you experienced a traumatic fall since your last visit: NO  Are these falls related to your seizures:  Not Applicable    EXAM:    Vitals:    11/21/23 0814   Weight: 165 lb (74.8 kg)   Height: 5' 11\" (180.3 cm)       General: General examination reveals a well developed male in no acute distress    Neurological Examination:    Mental Status: Alert and awake. Mood and affect were appropriate to situation. Memory appeared intact, not formally tested. Language without dysarthria.  Cranial Nerves:  II-XII grossly intact. Face is symmetric, tongue is midline. EOMI.  Motor: Moves upper extremities spontaneously and against gravity as visualized  Coordination: Finger-nose testing was normal and without tremor or ataxia as visualized      Impression:    Mr. Leonard has a history of focal impaired-aware seizures suspected to be left temporal onset. He remains seizure free, with tolerable side effects on levetiracetam 1500mg twice per day and oxcarbazepine 1200mg in the morning and 1500mg in the evening.    Discussion:    Mr. Leonard has remained seizure free since February 2020. He was given the opportunity to ask questions which I answered to the best of my ability. Seizure safety precautions were provided.    Plan:    Mr. Leonard will continue on the same medication regimen. Refills have been provided to his confirmed pharmacy.    Lab orders will be mailed to him for medication " monitoring.    He will follow up in one year with myself or with Dr. Hickman. If they have any questions, concerns, or breakthrough seizures, he was encouraged to contact the clinic.      Thank you for letting me participate in your patient's care.      I spent 29 minutes in total today to provide comprehensive medical care; Time in: 833AM Time out: 846AM.   I spent 8 minutes writing the note and placing orders.   I spent 8 minutes reviewing the chart.     The rest of the time was spent with the patient in face to face interview. During this time key medical decisions were made with review of medical chart prior to visit, visit with patient, counseling/education, and post visit work, including documentation of note on the day of visit. I addressed all questions to the best of my ability the patient/caregiver raised in regards to epilepsy or related medical questions.

## 2023-11-21 NOTE — LETTER
2023       RE: Leobardo Leonard  : 2000   MRN: [id number]      Dear Colleague,    Thank you for referring your patient, Leobardo Leonard, to the [facility] EPILEPSY CARE at [facility]. Please see a copy of my visit note below.    [facility] Epilepsy Care Progress Note    Patient:  Leobardo Leonard  :  2000   Age:  23 year old   Today's Office Visit:  2023  Chief Complaint: Follow up seizures    Chief Complaint   Patient presents with    RECHECK         Epilepsy Data:  Leobardo is a 16-year-old, ambidextrous man whose seizures started on 2017.  Mom witnessed his seizure, which occurred out of sleep; he suddenly became stiff and started gurgling and shaking all over.  This lasted about 2 minutes.  He was confused postictally.  The patient binge drink on that day.  He had an EEG on 3/24/17, which was abnormal showing focal slowing and frequent sharp transients and spike-and-slow waves over the left frontotemporal head region, maximal at F7.  He was recommended to start lamotrigine by Dr. Calvert, but the patient wanted to get a second opinion. He had a seizure during EEG at [facility].  Mom admitted that she noted similar episode the day before but didn't know it's a seizure.  The patient does not drink regularly, but he does binge drinking every time he drinks.  We started him on oxcarbazepine in 2017.      History of Present Illness: Mr. Leonard was last seen by Dr. 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 He remained seizure free on levetiracetam 1500mg BID and oxcarbazepine 1200-1500mg, which he was to continue.     Today, Mr Leonard presents on the video visit with his mother. His last seizure was in 2020.     Seizures since last seen: No   Medication side effects: He states he will get dizzy after he takes his  medication which is more of a lightheadedness, short in duration and tolerable.   Any recent infection: States he has been doing well, denies any new diagnoses in the last year.   Missed doses of medication: Takes his medication 930AM and 930PM - denies any missed doses.   Using a Pillbox: Yes   change: Denies  Non-AED medication changes: Denies   Decreased sleep: 7 to 8 hours of sleep per night, and feels refreshed.  Alcohol or illicit drug use: Alcohol use is one to two times per week, four beers in a sitting. He denies illicit drug use.  Increased stress: States this has been fine, no issues with stress  Mood Concerns: Denies any depression, anxiety, or mood concerns  Driving: He does drive, without any issues.   Triggers for seizures: Denies any known triggers.     He was given the opportunity to ask questions which I answered to the best of my ability.       Diagnostic studies reviewed as below:  MRI head w/o 3/14/2017:  Impression:    1. Mildly low-lying cerebellar tonsils.    2. Otherwise unremarkable noncontrast brain MRI for age.     3 hour video EEG 4/18/2017:  IMPRESSION:  This is an abnormal video EEG due to the presence of focal delta slowing as well as very frequent epileptiform discharges over the left frontotemporal head region.  The patient also had a complex partial seizure of left frontotemporal onset.  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     Levetiracetam  10.0 - 40.0 mcg/mL 27.3   Comment:  -------------------ADDITIONAL INFORMATION-------------------  This test was developed and its performance characteristics  determined by [facility] in a manner consistent with CLIA  requirements. This test has not been cleared or approved by  the U.S. Food and Drug Administration.    Test Performed by:  [facility]  [place] [id number]  : Markie Figueredo M.D. Ph.D.; CLIA# [id number]   Resulting [facility]     Specimen Collected: 12/02/22     Oxycarbazepine Metabolite (MHC)  10 - 35 mcg/mL 25   Comment:  -------------------ADDITIONAL INFORMATION-------------------  This test was developed and its performance characteristics  determined by [facility] in a manner consistent with CLIA  requirements. This test has not been cleared or approved by  the U.S. Food and Drug Administration.    Test Performed by:  [facility] [place] [id number]  : Markie Figueredo M.D. Ph.D.; CLIA# [id number]   Resulting [facility]     Specimen Collected: 12/02/22     Sodium  136 - 146 mmol/L 137   Resulting [facility]     Specimen Collected: 12/02/22     Histories:    Past Medical History:   Diagnosis Date    ADHD (attention deficit hyperactivity disorder)        Past Surgical History:   Procedure Laterality Date    ADENOIDECTOMY      MYRINGOTOMY, INSERT TUBE, COMBINED      TONSILLECTOMY         Social History     Socioeconomic History    Marital status: Single     Spouse name: None    Number of children: None    Years of education: None    Highest education level: None   Tobacco Use    Smoking status: Former    Smokeless tobacco: Current     Types: Chew   Substance and Sexual Activity    Alcohol use: Yes     Comment: beer once in awhile    Drug use: No     He is ambidextrous. He has black labrador retrievers.        No family history on file.    There is no problem list on file for this patient.      Current Outpatient Rx   Medication Sig Dispense Refill    fluticasone (FLONASE) 50 MCG/ACT nasal spray Spray 1 spray into both nostrils daily      ibuprofen (ADVIL/MOTRIN) 200 MG tablet       levETIRAcetam (KEPPRA) 1000 MG tablet Take 1.5 tablets (1,500 mg) by mouth  "2 times daily 270 tablet 3    multivitamin w/minerals (MULTI-VITAMIN) tablet Take 1 tablet by mouth every morning      OXcarbazepine (TRILEPTAL) 600 MG tablet TAKE 2 TABLETS BY MOUTH EVERY MORNING AND 2 AND 1/2 TABLETS EVERY EVENING. 405 tablet 3    SM CALCIUM 600+D3 600-800 MG-UNIT TABS TAKE 1 TABLET BY MOUTH ONCE DAILY 30 tablet 11         Allergies:       Allergies   Allergen Reactions    Cephalexin      Other reaction(s): Unknown Reaction        Review of Systems:  Lethargy / Tiredness:  No  Sleepiness:  No  Depression:  No  Dizziness:  Yes - as above  Have you experienced a traumatic fall since your last visit: NO  Are these falls related to your seizures:  Not Applicable    EXAM:    Vitals:    11/21/23 0814   Weight: 165 lb (74.8 kg)   Height: 5' 11\" (180.3 cm)       General: General examination reveals a well developed male in no acute distress    Neurological Examination:    Mental Status: Alert and awake. Mood and affect were appropriate to situation. Memory appeared intact, not formally tested. Language without dysarthria.  Cranial Nerves:  II-XII grossly intact. Face is symmetric, tongue is midline. EOMI.  Motor: Moves upper extremities spontaneously and against gravity as visualized  Coordination: Finger-nose testing was normal and without tremor or ataxia as visualized      Impression:    Mr. Leonard has a history of focal impaired-aware seizures suspected to be left temporal onset. He remains seizure free, with tolerable side effects on levetiracetam 1500mg twice per day and oxcarbazepine 1200mg in the morning and 1500mg in the evening.    Discussion:    Mr. Leonard has remained seizure free since February 2020. He was given the opportunity to ask questions which I answered to the best of my ability. Seizure safety precautions were provided.    Plan:    Mr. Leonard will continue on the same medication regimen. Refills have been provided to his confirmed pharmacy.    Lab orders will be mailed " to him for medication monitoring.    He will follow up in one year with myself or with Dr. Hickman. If they have any questions, concerns, or breakthrough seizures, he was encouraged to contact the clinic.    Thank you for letting me participate in your patient's care.      I spent 29 minutes in total today to provide comprehensive medical care; Time in: 833AM Time out: 846AM.   I spent 8 minutes writing the note and placing orders.   I spent 8 minutes reviewing the chart.     The rest of the time was spent with the patient in face to face interview. During this time key medical decisions were made with review of medical chart prior to visit, visit with patient, counseling/education, and post visit work, including documentation of note on the day of visit. I addressed all questions to the best of my ability the patient/caregiver raised in regards to epilepsy or related medical questions.       Again, thank you for allowing me to participate in the care of your patient.      Sincerely,    Yolanda Charles PA-C

## 2023-11-25 ENCOUNTER — HEALTH MAINTENANCE LETTER (OUTPATIENT)
Age: 23
End: 2023-11-25

## 2024-01-23 ENCOUNTER — MYC MEDICAL ADVICE (OUTPATIENT)
Dept: NEUROLOGY | Facility: CLINIC | Age: 24
End: 2024-01-23

## 2024-12-02 DIAGNOSIS — G40.109 FOCAL EPILEPSY (H): ICD-10-CM

## 2024-12-24 ENCOUNTER — TELEPHONE (OUTPATIENT)
Dept: NEUROLOGY | Facility: CLINIC | Age: 24
End: 2024-12-24

## 2024-12-24 DIAGNOSIS — G40.109 FOCAL EPILEPSY (H): ICD-10-CM

## 2024-12-24 RX ORDER — OXCARBAZEPINE 600 MG/1
TABLET, FILM COATED ORAL
Qty: 405 TABLET | Refills: 1 | Status: SHIPPED | OUTPATIENT
Start: 2024-12-24

## 2024-12-24 NOTE — TELEPHONE ENCOUNTER
Continue your current medications of leveiracetam 1500mg twice per day and oxcarbazepine 1200mg in the morning and 1500mg in the evening        Last seen: 11/2023  RTC: 1 yr   Cancel: no  No-show: no  Next appt: Jan 2025    Incoming refill from pt  via phones    Medication requested: OXcarbazepine (TRILEPTAL) 600 MG tablet   Directions: TAKE 2 TABLETS BY MOUTH EVERY MORNING AND 2 AND 1/2 TABLETS EVERY EVENING.   Qty:405 + 1  Last refilled: 11/21/23    Medication refill approved per refill protocol

## 2024-12-24 NOTE — TELEPHONE ENCOUNTER
Which pharmacy does pt go to?  [id number] - [place], [place] - [address]    Which medication is pt calling about?  Oxcarbazemine     How much medication does pt have left?  <5 days left, 2 days left    Does pt have refills?  NO    Does pt need the refill within 24 hours?  YES    Is medication a controlled substance?  NO    Is pt scheduled for provider follow-up visit?  YES, Appt Date: 1/22/25

## 2024-12-29 ENCOUNTER — HEALTH MAINTENANCE LETTER (OUTPATIENT)
Age: 24
End: 2024-12-29

## 2024-12-29 RX ORDER — OXCARBAZEPINE 600 MG/1
TABLET, FILM COATED ORAL
Qty: 405 TABLET | Refills: 3 | OUTPATIENT
Start: 2024-12-29

## 2025-01-22 ENCOUNTER — VIRTUAL VISIT (OUTPATIENT)
Dept: NEUROLOGY | Facility: CLINIC | Age: 25
End: 2025-01-22
Payer: COMMERCIAL

## 2025-01-22 ENCOUNTER — TELEPHONE (OUTPATIENT)
Dept: NEUROLOGY | Facility: CLINIC | Age: 25
End: 2025-01-22

## 2025-01-22 DIAGNOSIS — G40.109 FOCAL EPILEPSY (H): Primary | ICD-10-CM

## 2025-01-22 RX ORDER — OXCARBAZEPINE 600 MG/1
TABLET, FILM COATED ORAL
Qty: 405 TABLET | Refills: 3 | Status: SHIPPED | OUTPATIENT
Start: 2025-01-22

## 2025-01-22 RX ORDER — LEVETIRACETAM 1000 MG/1
1500 TABLET ORAL 2 TIMES DAILY
Qty: 270 TABLET | Refills: 3 | Status: SHIPPED | OUTPATIENT
Start: 2025-01-22

## 2025-01-22 NOTE — TELEPHONE ENCOUNTER
RN faxed labs ordered from 1/22 appointment to [facility] ([place]) per request from Yolanda Charles.

## 2025-01-22 NOTE — PATIENT INSTRUCTIONS
Continue current medications unchanged    We will check an oxcarbazepine level and updated sodium level for monitoring, as unfortunately the oxcarbazepine level was not completed when your other labs were.    Follow up with Dr. García or myself in person in 1 year. Call sooner with questions, concerns, or worsening symptoms in the meantime, including breakthrough events.    Seizure safety precautions:  [place] regulations on driving were reviewed with you and you should not drive until you are three months seizure/spell free.You are prohibited from operating a motor vehicle within 3 months following any seizure or other episode with sudden unconsciousness or inability to sit up, and that it is required to report most recent seizure to the DMV within 30 days after the event.    Avoid any activities that might lead to self-injury or injury of others, within 3 months following any seizure with impaired awareness or impaired motor control such activities include but are not limited to operating power tools, operating firearms, climbing ladders/trees/exposure to heights from which he might fall. Do not operate power tools or heavy machinery and equipment.  Do not swim alone, bathe in any form of tub, such as bathtub, jacuzzi, or hot tub unless there is a responsible adult close by to provide assistance in case she has a seizure and drowns. Avoid work on hot surfaces such stoves, ovens, or with scalding hot water.

## 2025-01-22 NOTE — PROGRESS NOTES
Is the patient currently in the state of [place]? YES    Visit mode:VIDEO    If the visit is dropped, the patient can be reconnected by: TELEPHONE VISIT: Phone number: [phone]    Will anyone else be joining the visit?       How would you like to obtain your AVS? MyChart        Reason for visit: Follow Up

## 2025-01-22 NOTE — LETTER
2025       RE: Leobardo Leonard  : 2000   MRN: [id number]      Dear Colleague,    Thank you for referring your patient, Leobardo Leonard, [facility] at [facility]. Please see a copy of my visit note below.    Video-Visit Details  Type of service:  Video Visit    Video Start Time: 11:50 AM    Video End Time:12:05 PM    Originating Location (pt. Location): Home    Distant Location (provider location):  [facility] EPILEPSY CARE     Platform used for Video Visit: [facility] Epilepsy Care Progress Note      Patient:  Leobardo Leonard  :  2000   Age:  24 year old   Today's Office Visit:  2025  Chief Complaint: Follow up seizures    Epilepsy Data:  Leobardo is a 16-year-old, ambidextrous man whose seizures started on 2017.  Mom witnessed his seizure, which occurred out of sleep; he suddenly became stiff and started gurgling and shaking all over.  This lasted about 2 minutes.  He was confused postictally.  The patient binge drink on that day.  He had an EEG on 3/24/17, which was abnormal showing focal slowing and frequent sharp transients and spike-and-slow waves over the left frontotemporal head region, maximal at F7.  He was recommended to start lamotrigine by Dr. Calvert, but the patient wanted to get a second opinion. He had a seizure during EEG at [facility].  Mom admitted that she noted similar episode the day before but didn't know it's a seizure.  The patient does not drink regularly, but he does binge drinking every time he drinks.  We started him on oxcarbazepine in 2017.        History of Present Illness:   Mr. Leonard was last seen by myself virtually on 2023. 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 He remained seizure free since 2020, with tolerable side effects on  levetiracetam 6868-2046 and oxcarbazepine 9021-2593 which he was to continue. Labs were ordered for medication monitoring. He was to follow up in 1 year.      Today, Mr. Leonard presents with his mother who contributes to the history. He denies any seizure since last seen. He will have dizziness if he does not eat prior to taking his antiseizure medications, but otherwise tolerates these well. He has been good about eating to avoid this.     His last seizure was February 2020.    He denies any new diagnoses or medications since last seen.    He states he is sleeping well.     He is working as a .     They were given the opportunity to ask questions which I answered to the best of my ability.     Current Outpatient Medications   Medication Sig Dispense Refill     fluticasone (FLONASE) 50 MCG/ACT nasal spray Spray 1 spray into both nostrils daily       levETIRAcetam (KEPPRA) 1000 MG tablet Take 1.5 tablets (1,500 mg) by mouth 2 times daily. 270 tablet 3     multivitamin w/minerals (MULTI-VITAMIN) tablet Take 1 tablet by mouth every morning       OXcarbazepine (TRILEPTAL) 600 MG tablet TAKE 2 TABLETS BY MOUTH EVERY MORNING AND 2 AND 1/2 TABLETS EVERY EVENING. 405 tablet 3     SM CALCIUM 600+D3 600-800 MG-UNIT TABS TAKE 1 TABLET BY MOUTH ONCE DAILY 30 tablet 11     ibuprofen (ADVIL/MOTRIN) 200 MG tablet           Medication Notes:  dosing is around 10AM and 10PM.       AED Medication Compliance:  compliant most of the time  Using a pill box:  Yes    Diagnostic studies reviewed:   Latest Reference Range & Units 12/02/22 14:23   OXCARBAZEPINE LEVEL (EXTERNAL) 10 - 35 mcg/mL 25      Latest Reference Range & Units 03/05/24 16:05   Sodium (External) 136 - 146 mmol/L 136   KEPPRA (LEVETIRACETAM) LEVEL (EXTERNAL) 10.0 - 40.0 mcg/mL 15.1     Video EEG 4/18/2017:  CLINICAL/ICTAL EVENTS:  The patient had a complex partial seizure out of sleep, which started at 13:59:34.  The patient was in stage 2 sleep.  He  opened his eyes at 13:59:40.  He was looking straight.  Then he moved under the sheets.  Then he became quiet and just had some eyeblinking.  Electrographically, seizure started at 13:59:34 with rhythmic, sharply contoured delta activity over the left frontotemporal head region, maximal at F7/T3; with T1, T2 reference had higher amplitude at T3.  This evolved into rhythmic, sharply contoured alpha activity and spread to the left parasagittal derivative.  It had minor spread to the right anterior leads, with involvement of FP2 electrode with rhythmic delta activity.  Ictal discharge evolved to higher-amplitude rhythmic theta and then slowed down to rhythmic delta activity over the entire left hemisphere.  It ended at 14:00:28.  The patient was not able to read, name an object or follow commands postictally and was unresponsive to the tech.  He was able to partially read the sentence with some difficulty at 14:01:18 and was able to follow commands at 14:01:48 after several repetitions.  He did not remember the memory phrase.  At 14:04:01, he was able to read Ok.  This was approximately 3 minutes 30 seconds after the seizure.        IMPRESSION:  This is an abnormal video EEG due to the presence of focal delta slowing as well as very frequent epileptiform discharges over the left frontotemporal head region.  The patient also had a complex partial seizure of left frontotemporal onset.  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     MRI brain wo 3/24/2017 [facility]:  Findings: There is no evidence of highly restricted diffusion to    suggest acute or subacute infarct. The corpus callosum is preserved.    Cerebellar tonsils are mildly low-lying, projecting 5 mm below the    level of the foramen Magnum. No cervical cord syrinx is identified.    Normal marrow signal. Sella and  suprasellar region are unremarkable.        Subtle appearing undulation of the posterior lateral aspect of the    right lateral ventricle is seen on FLAIR T2 axial images is not    corroborated on the thin section T1-weighted images. This is likely    related to volume averaging. No definite evidence of heterotopia.    Ventricles and sulci are appropriate for stated age.        No mass lesion is identified. No mass effect. No abnormal extra-axial    fluid collection. Hippocampal regions demonstrate symmetric signal    intensity and size bilaterally.        Brainstem, fourth ventricle, and posterior fossa are otherwise    unremarkable. No CP angle mass lesion. There are appropriate skull    base vascular flow voids. No acute intraorbital abnormality.    Paranasal sinuses are well pneumatized.        Impression:    1. Mildly low-lying cerebellar tonsils.    2. Otherwise unremarkable noncontrast brain MRI for age.     Review of Systems:  Lethargy / Tiredness:  No  Nausea / Vomiting:  No  Double Vision:  No  Sleepiness:  No  Depression:  No  Slowed Cognitive Function:  No  Memory Problems:  No  Poor Balance:  No  Dizziness:  Yes - if he does not eat first  Blurred Vision:  No  Skin: negative  Respiratory: No shortness of breath  Cardiovascular: negative  Have you experienced a traumatic fall since your last visit: NO  Are these falls related to your seizures: NO    Other Issues:    Is patient safe to drive:  Yes    Exam:    There were no vitals taken for this visit.     Wt Readings from Last 5 Encounters:   11/21/23 165 lb (74.8 kg)   11/07/23 170 lb (77.1 kg)   10/31/22 167 lb (75.8 kg)   10/14/19 174 lb (78.9 kg)   06/17/19 174 lb 9.6 oz (79.2 kg) (76%, Z= 0.70)*     * Growth percentiles are based on CDC (Boys, 2-20 Years) data.     General: General examination reveals a well developed male in no acute distress    Neurological Examination:    Mental Status: Alert and awake. Mood and affect were appropriate to  situation. Memory appeared intact, not formally tested. Language without dysarthria.  Cranial Nerves:  II-XII grossly intact. Face is symmetric. EOMI.   Motor: Moves upper extremities spontaneously and against gravity as visualized  Coordination: No gross tremor visualized     Assessment and Plan:   Mr. 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 He remains seizure free since February 2020. He has mild dizziness with his medications if not taken with food, which he finds manageable.     At this time Mr. Leonard will continue levetiracetam 1101-4021 and oxcarbazepine 1782-8607. Refills were provided.     Unfortunately when his labs were drawn earlier this year, the oxcarbazepine was not completed. We will obtain an updated oxcarbazepine level, sodium level, and liver function testing for monitoring. An updated levetiracetam level is not needed today.    The patient was advised to maintain proper seizure precautions. [place] regulations on driving were reviewed with the patient. The patient clearly understands that they are prohibited from operating a motor vehicle within 3 months following any seizure or other episode with sudden unconsciousness or inability to sit up, and that it is required to report most recent seizure to the DMV within 30 days after the event.    Patient was advised to avoid any activities that might lead to self-injury or injury of others, within 3 months following any seizure with impaired awareness or impaired motor control such activities include but are not limited to operating power tools, operating firearms, climbing ladders/trees/exposure to heights from which they might fall. Patient should not operate power tools or heavy machinery and equipment.  Patient was advised not to swim alone.  Patient should not bathe in any form of tub, such as bathtub, jacuzzi, or hot tub unless there is a responsible adult close by to provide assistance  in case they have a seizure and drowns. Patient should not work on hot surfaces such stoves, ovens, or with scalding hot water.   Mr. Leonard will follow up in person in 1 year with myself or Dr. García. If they have questions, concerns, or worsening symptoms including breakthrough events they were advised to contact the clinic.  Thank you for letting me participate in your patient's care.         I spent 28 minutes in total today to provide comprehensive medical care; Time in: 1150AM Time out: 1205AM.   I spent 7 minutes writing the note and placing orders.   I spent 6 minutes reviewing the chart.     The rest of the time was spent with the patient in face to face interview. During this time key medical decisions were made with review of medical chart prior to visit, visit with patient, counseling/education, and post visit work, including documentation of note on the day of visit. I addressed all questions the patient/caregiver raised in regards to epilepsy or related medical questions.        The longitudinal plan of care for the diagnosis(es)/condition(s) as documented were addressed during this visit. Due to the added complexity in care, I will continue to support Leobardo in the subsequent management and with ongoing continuity of care.              Is the patient currently in the state of [place]? YES    Visit mode:VIDEO    If the visit is dropped, the patient can be reconnected by: TELEPHONE VISIT: Phone number: [phone]    Will anyone else be joining the visit? YES: How would they like to receive their invitation? Other e-mail: ***  {If patient encounters technical issues they should call [phone] :[id number]}    How would you like to obtain your AVS? MyChart    Are changes needed to the allergy or medication list? {YES OR NO:309010}    Reason for visit: Follow Up           Again, thank you for allowing me to participate in the care of your patient.      Sincerely,    Yolanda Charles PA-C

## 2025-01-22 NOTE — PROGRESS NOTES
Video-Visit Details  Type of service:  Video Visit    Video Start Time: 11:50 AM    Video End Time:12:05 PM    Originating Location (pt. Location): Home    Distant [facility] (provider [facility]     Platform used for Video Visit: [facility] [place] Progress Note      Patient:  Leobardo Leonard  :  2000   Age:  24 year old   Today's Office Visit:  2025  Chief Complaint: Follow up seizures    Epilepsy Data:  Leobardo is a 16-year-old, ambidextrous man whose seizures started on 2017.  Mom witnessed his seizure, which occurred out of sleep; he suddenly became stiff and started gurgling and shaking all over.  This lasted about 2 minutes.  He was confused postictally.  The patient binge drink on that day.  He had an EEG on 3/24/17, which was abnormal showing focal slowing and frequent sharp transients and spike-and-slow waves over the left frontotemporal head region, maximal at F7.  He was recommended to start lamotrigine by Dr. Calvert, but the patient wanted to get a second opinion. He had a seizure during EEG at [facility].  Mom admitted that she noted similar episode the day before but didn't know it's a seizure.  The patient does not drink regularly, but he does binge drinking every time he drinks.  We started him on oxcarbazepine in 2017.        History of Present Illness:   Mr. Leonard was last seen by myself virtually on 2023. 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 He remained seizure free since 2020, with tolerable side effects on levetiracetam [id number] and oxcarbazepine [id number] which he was to continue. Labs were ordered for medication monitoring. He was to follow up in 1 year.      Today, Mr. Leonard presents with his mother who contributes to the history. He denies any seizure since last seen. He will have dizziness if he does  not eat prior to taking his antiseizure medications, but otherwise tolerates these well. He has been good about eating to avoid this.     His last seizure was February 2020.    He denies any new diagnoses or medications since last seen.    He states he is sleeping well.     He is working as a .     They were given the opportunity to ask questions which I answered to the best of my ability.     Current Outpatient Medications   Medication Sig Dispense Refill    fluticasone (FLONASE) 50 MCG/ACT nasal spray Spray 1 spray into both nostrils daily      levETIRAcetam (KEPPRA) 1000 MG tablet Take 1.5 tablets (1,500 mg) by mouth 2 times daily. 270 tablet 3    multivitamin w/minerals (MULTI-VITAMIN) tablet Take 1 tablet by mouth every morning      OXcarbazepine (TRILEPTAL) 600 MG tablet TAKE 2 TABLETS BY MOUTH EVERY MORNING AND 2 AND 1/2 TABLETS EVERY EVENING. 405 tablet 3    SM CALCIUM 600+D3 600-800 MG-UNIT TABS TAKE 1 TABLET BY MOUTH ONCE DAILY 30 tablet 11    ibuprofen (ADVIL/MOTRIN) 200 MG tablet           Medication Notes:  dosing is around 10AM and 10PM.       AED Medication Compliance:  compliant most of the time  Using a pill box:  Yes    Diagnostic studies reviewed:   Latest Reference Range & Units 12/02/22 14:23   OXCARBAZEPINE LEVEL (EXTERNAL) 10 - 35 mcg/mL 25      Latest Reference Range & Units 03/05/24 16:05   Sodium (External) 136 - 146 mmol/L 136   KEPPRA (LEVETIRACETAM) LEVEL (EXTERNAL) 10.0 - 40.0 mcg/mL 15.1     Video EEG 4/18/2017:  CLINICAL/ICTAL EVENTS:  The patient had a complex partial seizure out of sleep, which started at 13:59:34.  The patient was in stage 2 sleep.  He opened his eyes at 13:59:40.  He was looking straight.  Then he moved under the sheets.  Then he became quiet and just had some eyeblinking.  Electrographically, seizure started at 13:59:34 with rhythmic, sharply contoured delta activity over the left frontotemporal head region, maximal at F7/T3; with T1, T2 reference  had higher amplitude at T3.  This evolved into rhythmic, sharply contoured alpha activity and spread to the left parasagittal derivative.  It had minor spread to the right anterior leads, with involvement of FP2 electrode with rhythmic delta activity.  Ictal discharge evolved to higher-amplitude rhythmic theta and then slowed down to rhythmic delta activity over the entire left hemisphere.  It ended at 14:00:28.  The patient was not able to read, name an object or follow commands postictally and was unresponsive to the tech.  He was able to partially read the sentence with some difficulty at 14:01:18 and was able to follow commands at 14:01:48 after several repetitions.  He did not remember the memory phrase.  At 14:04:01, he was able to read Ok.  This was approximately 3 minutes 30 seconds after the seizure.        IMPRESSION:  This is an abnormal video EEG due to the presence of focal delta slowing as well as very frequent epileptiform discharges over the left frontotemporal head region.  The patient also had a complex partial seizure of left frontotemporal onset.  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     MRI brain wo 3/24/2017 [facility]:  Findings: There is no evidence of highly restricted diffusion to    suggest acute or subacute infarct. The corpus callosum is preserved.    Cerebellar tonsils are mildly low-lying, projecting 5 mm below the    level of the foramen Magnum. No cervical cord syrinx is identified.    Normal marrow signal. Sella and suprasellar region are unremarkable.        Subtle appearing undulation of the posterior lateral aspect of the    right lateral ventricle is seen on FLAIR T2 axial images is not    corroborated on the thin section T1-weighted images. This is likely    related to volume averaging. No definite evidence of heterotopia.     Ventricles and sulci are appropriate for stated age.        No mass lesion is identified. No mass effect. No abnormal extra-axial    fluid collection. Hippocampal regions demonstrate symmetric signal    intensity and size bilaterally.        Brainstem, fourth ventricle, and posterior fossa are otherwise    unremarkable. No CP angle mass lesion. There are appropriate skull    base vascular flow voids. No acute intraorbital abnormality.    Paranasal sinuses are well pneumatized.        Impression:    1. Mildly low-lying cerebellar tonsils.    2. Otherwise unremarkable noncontrast brain MRI for age.     Review of Systems:  Lethargy / Tiredness:  No  Nausea / Vomiting:  No  Double Vision:  No  Sleepiness:  No  Depression:  No  Slowed Cognitive Function:  No  Memory Problems:  No  Poor Balance:  No  Dizziness:  Yes - if he does not eat first  Blurred Vision:  No  Skin: negative  Respiratory: No shortness of breath  Cardiovascular: negative  Have you experienced a traumatic fall since your last visit: NO  Are these falls related to your seizures: NO    Other Issues:    Is patient safe to drive:  Yes    Exam:    There were no vitals taken for this visit.     Wt Readings from Last 5 Encounters:   11/21/23 165 lb (74.8 kg)   11/07/23 170 lb (77.1 kg)   10/31/22 167 lb (75.8 kg)   10/14/19 174 lb (78.9 kg)   06/17/19 174 lb 9.6 oz (79.2 kg) (76%, Z= 0.70)*     * Growth percentiles are based on CDC (Boys, 2-20 Years) data.     General: General examination reveals a well developed male in no acute distress    Neurological Examination:    Mental Status: Alert and awake. Mood and affect were appropriate to situation. Memory appeared intact, not formally tested. Language without dysarthria.  Cranial Nerves:  II-XII grossly intact. Face is symmetric. EOMI.   Motor: Moves upper extremities spontaneously and against gravity as visualized  Coordination: No gross tremor visualized     Assessment and Plan:   Mr. 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 He remains seizure free since February 2020. He has mild dizziness with his medications if not taken with food, which he finds manageable.     At this time Mr. Leonard will continue levetiracetam [id number] and oxcarbazepine [id number]. Refills were provided.     Unfortunately when his labs were drawn earlier this year, the oxcarbazepine was not completed. We will obtain an updated oxcarbazepine level, sodium level, and liver function testing for monitoring. An updated levetiracetam level is not needed today.    The patient was advised to maintain proper seizure precautions. [place] regulations on driving were reviewed with the patient. The patient clearly understands that they are prohibited from operating a motor vehicle within 3 months following any seizure or other episode with sudden unconsciousness or inability to sit up, and that it is required to report most recent seizure to the DMV within 30 days after the event.    Patient was advised to avoid any activities that might lead to self-injury or injury of others, within 3 months following any seizure with impaired awareness or impaired motor control such activities include but are not limited to operating power tools, operating firearms, climbing ladders/trees/exposure to heights from which they might fall. Patient should not operate power tools or heavy machinery and equipment.  Patient was advised not to swim alone.  Patient should not bathe in any form of tub, such as bathtub, jacuzzi, or hot tub unless there is a responsible adult close by to provide assistance in case they have a seizure and drowns. Patient should not work on hot surfaces such stoves, ovens, or with scalding hot water.   Mr. Leonard will follow up in person in 1 year with myself or Dr. García. If they have questions, concerns, or worsening symptoms including breakthrough events they were advised to contact the  clinic.  Thank you for letting me participate in your patient's care.         I spent 28 minutes in total today to provide comprehensive medical care; Time in: 1150AM Time out: 1205AM.   I spent 7 minutes writing the note and placing orders.   I spent 6 minutes reviewing the chart.     The rest of the time was spent with the patient in face to face interview. During this time key medical decisions were made with review of medical chart prior to visit, visit with patient, counseling/education, and post visit work, including documentation of note on the day of visit. I addressed all questions the patient/caregiver raised in regards to epilepsy or related medical questions.        The longitudinal plan of care for the diagnosis(es)/condition(s) as documented were addressed during this visit. Due to the added complexity in care, I will continue to support Leobardo in the subsequent management and with ongoing continuity of care.

## 2025-05-13 ENCOUNTER — RESULTS FOLLOW-UP (OUTPATIENT)
Dept: NEUROLOGY | Facility: CLINIC | Age: 25
End: 2025-05-13